# Patient Record
Sex: MALE | Race: WHITE | NOT HISPANIC OR LATINO
[De-identification: names, ages, dates, MRNs, and addresses within clinical notes are randomized per-mention and may not be internally consistent; named-entity substitution may affect disease eponyms.]

---

## 2017-05-16 ENCOUNTER — APPOINTMENT (OUTPATIENT)
Dept: CARDIOLOGY | Facility: CLINIC | Age: 72
End: 2017-05-16

## 2017-05-16 VITALS
DIASTOLIC BLOOD PRESSURE: 74 MMHG | WEIGHT: 166 LBS | BODY MASS INDEX: 26.06 KG/M2 | OXYGEN SATURATION: 94 % | HEART RATE: 62 BPM | SYSTOLIC BLOOD PRESSURE: 136 MMHG | HEIGHT: 67 IN

## 2017-05-16 RX ORDER — ERGOCALCIFEROL (VITAMIN D2) 1250 MCG
50000 CAPSULE ORAL
Refills: 0 | Status: ACTIVE | COMMUNITY

## 2017-05-16 RX ORDER — FEBUXOSTAT 40 MG/1
40 TABLET ORAL DAILY
Qty: 90 | Refills: 3 | Status: ACTIVE | COMMUNITY

## 2017-06-20 ENCOUNTER — APPOINTMENT (OUTPATIENT)
Dept: ULTRASOUND IMAGING | Facility: HOSPITAL | Age: 72
End: 2017-06-20

## 2017-06-20 ENCOUNTER — OUTPATIENT (OUTPATIENT)
Dept: OUTPATIENT SERVICES | Facility: HOSPITAL | Age: 72
LOS: 1 days | End: 2017-06-20
Payer: MEDICARE

## 2017-06-20 DIAGNOSIS — I73.9 PERIPHERAL VASCULAR DISEASE, UNSPECIFIED: ICD-10-CM

## 2017-06-20 PROCEDURE — 93923 UPR/LXTR ART STDY 3+ LVLS: CPT | Mod: 26

## 2017-06-20 PROCEDURE — 93926 LOWER EXTREMITY STUDY: CPT

## 2017-06-20 PROCEDURE — 93926 LOWER EXTREMITY STUDY: CPT | Mod: 26,RT

## 2017-06-20 PROCEDURE — 93923 UPR/LXTR ART STDY 3+ LVLS: CPT

## 2018-12-01 ENCOUNTER — OUTPATIENT (OUTPATIENT)
Dept: OUTPATIENT SERVICES | Facility: HOSPITAL | Age: 73
LOS: 1 days | End: 2018-12-01
Payer: MEDICARE

## 2018-12-01 PROCEDURE — G9001: CPT

## 2018-12-04 ENCOUNTER — APPOINTMENT (OUTPATIENT)
Dept: CARDIOLOGY | Facility: CLINIC | Age: 73
End: 2018-12-04
Payer: MEDICARE

## 2018-12-04 ENCOUNTER — NON-APPOINTMENT (OUTPATIENT)
Age: 73
End: 2018-12-04

## 2018-12-04 VITALS
HEIGHT: 67 IN | DIASTOLIC BLOOD PRESSURE: 82 MMHG | BODY MASS INDEX: 25.74 KG/M2 | WEIGHT: 164 LBS | HEART RATE: 69 BPM | OXYGEN SATURATION: 98 % | SYSTOLIC BLOOD PRESSURE: 177 MMHG

## 2018-12-04 PROCEDURE — 99215 OFFICE O/P EST HI 40 MIN: CPT

## 2018-12-04 PROCEDURE — 93000 ELECTROCARDIOGRAM COMPLETE: CPT

## 2018-12-04 RX ORDER — LOSARTAN POTASSIUM 50 MG/1
50 TABLET, FILM COATED ORAL DAILY
Qty: 90 | Refills: 3 | Status: DISCONTINUED | COMMUNITY
Start: 2018-12-04 | End: 2018-12-04

## 2018-12-04 NOTE — PHYSICAL EXAM
[General Appearance - Well Developed] : well developed [Normal Appearance] : normal appearance [Well Groomed] : well groomed [General Appearance - Well Nourished] : well nourished [No Deformities] : no deformities [General Appearance - In No Acute Distress] : no acute distress [Normal Conjunctiva] : the conjunctiva exhibited no abnormalities [Eyelids - No Xanthelasma] : the eyelids demonstrated no xanthelasmas [Normal Oral Mucosa] : normal oral mucosa [No Oral Pallor] : no oral pallor [No Oral Cyanosis] : no oral cyanosis [Normal Jugular Venous A Waves Present] : normal jugular venous A waves present [Normal Jugular Venous V Waves Present] : normal jugular venous V waves present [No Jugular Venous Arrieta A Waves] : no jugular venous arrieta A waves [Respiration, Rhythm And Depth] : normal respiratory rhythm and effort [Exaggerated Use Of Accessory Muscles For Inspiration] : no accessory muscle use [Auscultation Breath Sounds / Voice Sounds] : lungs were clear to auscultation bilaterally [Heart Rate And Rhythm] : heart rate and rhythm were normal [Heart Sounds] : normal S1 and S2 [Murmurs] : no murmurs present [Abdomen Soft] : soft [Abdomen Tenderness] : non-tender [Abdomen Mass (___ Cm)] : no abdominal mass palpated [Abnormal Walk] : normal gait [Gait - Sufficient For Exercise Testing] : the gait was sufficient for exercise testing [Nail Clubbing] : no clubbing of the fingernails [Cyanosis, Localized] : no localized cyanosis [Petechial Hemorrhages (___cm)] : no petechial hemorrhages [Skin Color & Pigmentation] : normal skin color and pigmentation [] : no rash [No Venous Stasis] : no venous stasis [Skin Lesions] : no skin lesions [No Skin Ulcers] : no skin ulcer [No Xanthoma] : no  xanthoma was observed [Oriented To Time, Place, And Person] : oriented to person, place, and time [Affect] : the affect was normal [Mood] : the mood was normal [No Anxiety] : not feeling anxious

## 2018-12-11 ENCOUNTER — EMERGENCY (EMERGENCY)
Facility: HOSPITAL | Age: 73
LOS: 1 days | Discharge: HOME | End: 2018-12-11
Attending: EMERGENCY MEDICINE
Payer: MEDICARE

## 2018-12-11 VITALS
HEIGHT: 69 IN | SYSTOLIC BLOOD PRESSURE: 183 MMHG | HEART RATE: 69 BPM | OXYGEN SATURATION: 96 % | DIASTOLIC BLOOD PRESSURE: 65 MMHG | WEIGHT: 179.9 LBS | RESPIRATION RATE: 16 BRPM

## 2018-12-11 VITALS — TEMPERATURE: 97 F

## 2018-12-11 DIAGNOSIS — Z71.89 OTHER SPECIFIED COUNSELING: ICD-10-CM

## 2018-12-11 LAB
ALBUMIN SERPL ELPH-MCNC: 3.6 G/DL — SIGNIFICANT CHANGE UP (ref 3.5–5)
ALP SERPL-CCNC: 68 U/L — SIGNIFICANT CHANGE UP (ref 40–120)
ALT FLD-CCNC: 18 U/L DA — SIGNIFICANT CHANGE UP (ref 10–60)
ANION GAP SERPL CALC-SCNC: 8 MMOL/L — SIGNIFICANT CHANGE UP (ref 5–17)
APTT BLD: 33.6 SEC — SIGNIFICANT CHANGE UP (ref 27.5–36.3)
AST SERPL-CCNC: 18 U/L — SIGNIFICANT CHANGE UP (ref 10–40)
BASOPHILS # BLD AUTO: 0.1 K/UL — SIGNIFICANT CHANGE UP (ref 0–0.2)
BASOPHILS NFR BLD AUTO: 1.5 % — SIGNIFICANT CHANGE UP (ref 0–2)
BILIRUB SERPL-MCNC: 0.3 MG/DL — SIGNIFICANT CHANGE UP (ref 0.2–1.2)
BUN SERPL-MCNC: 18 MG/DL — SIGNIFICANT CHANGE UP (ref 7–18)
CALCIUM SERPL-MCNC: 8.5 MG/DL — SIGNIFICANT CHANGE UP (ref 8.4–10.5)
CHLORIDE SERPL-SCNC: 108 MMOL/L — SIGNIFICANT CHANGE UP (ref 96–108)
CO2 SERPL-SCNC: 27 MMOL/L — SIGNIFICANT CHANGE UP (ref 22–31)
CREAT SERPL-MCNC: 1.19 MG/DL — SIGNIFICANT CHANGE UP (ref 0.5–1.3)
EOSINOPHIL # BLD AUTO: 0.4 K/UL — SIGNIFICANT CHANGE UP (ref 0–0.5)
EOSINOPHIL NFR BLD AUTO: 5.9 % — SIGNIFICANT CHANGE UP (ref 0–6)
GLUCOSE SERPL-MCNC: 91 MG/DL — SIGNIFICANT CHANGE UP (ref 70–99)
HCT VFR BLD CALC: 39.1 % — SIGNIFICANT CHANGE UP (ref 39–50)
HGB BLD-MCNC: 12.5 G/DL — LOW (ref 13–17)
INR BLD: 0.97 RATIO — SIGNIFICANT CHANGE UP (ref 0.88–1.16)
LYMPHOCYTES # BLD AUTO: 1.7 K/UL — SIGNIFICANT CHANGE UP (ref 1–3.3)
LYMPHOCYTES # BLD AUTO: 22.8 % — SIGNIFICANT CHANGE UP (ref 13–44)
MCHC RBC-ENTMCNC: 29.6 PG — SIGNIFICANT CHANGE UP (ref 27–34)
MCHC RBC-ENTMCNC: 32 GM/DL — SIGNIFICANT CHANGE UP (ref 32–36)
MCV RBC AUTO: 92.6 FL — SIGNIFICANT CHANGE UP (ref 80–100)
MONOCYTES # BLD AUTO: 0.9 K/UL — SIGNIFICANT CHANGE UP (ref 0–0.9)
MONOCYTES NFR BLD AUTO: 11.7 % — SIGNIFICANT CHANGE UP (ref 2–14)
NEUTROPHILS # BLD AUTO: 4.3 K/UL — SIGNIFICANT CHANGE UP (ref 1.8–7.4)
NEUTROPHILS NFR BLD AUTO: 58 % — SIGNIFICANT CHANGE UP (ref 43–77)
PLATELET # BLD AUTO: 200 K/UL — SIGNIFICANT CHANGE UP (ref 150–400)
POTASSIUM SERPL-MCNC: 4 MMOL/L — SIGNIFICANT CHANGE UP (ref 3.5–5.3)
POTASSIUM SERPL-SCNC: 4 MMOL/L — SIGNIFICANT CHANGE UP (ref 3.5–5.3)
PROT SERPL-MCNC: 7.4 G/DL — SIGNIFICANT CHANGE UP (ref 6–8.3)
PROTHROM AB SERPL-ACNC: 10.8 SEC — SIGNIFICANT CHANGE UP (ref 10–12.9)
RBC # BLD: 4.22 M/UL — SIGNIFICANT CHANGE UP (ref 4.2–5.8)
RBC # FLD: 12.8 % — SIGNIFICANT CHANGE UP (ref 10.3–14.5)
SODIUM SERPL-SCNC: 143 MMOL/L — SIGNIFICANT CHANGE UP (ref 135–145)
TROPONIN I SERPL-MCNC: <0.015 NG/ML — SIGNIFICANT CHANGE UP (ref 0–0.04)
WBC # BLD: 7.4 K/UL — SIGNIFICANT CHANGE UP (ref 3.8–10.5)
WBC # FLD AUTO: 7.4 K/UL — SIGNIFICANT CHANGE UP (ref 3.8–10.5)

## 2018-12-11 PROCEDURE — 86900 BLOOD TYPING SEROLOGIC ABO: CPT

## 2018-12-11 PROCEDURE — 82962 GLUCOSE BLOOD TEST: CPT

## 2018-12-11 PROCEDURE — 80053 COMPREHEN METABOLIC PANEL: CPT

## 2018-12-11 PROCEDURE — 85027 COMPLETE CBC AUTOMATED: CPT

## 2018-12-11 PROCEDURE — 84484 ASSAY OF TROPONIN QUANT: CPT

## 2018-12-11 PROCEDURE — 70450 CT HEAD/BRAIN W/O DYE: CPT | Mod: 26

## 2018-12-11 PROCEDURE — 85610 PROTHROMBIN TIME: CPT

## 2018-12-11 PROCEDURE — 86901 BLOOD TYPING SEROLOGIC RH(D): CPT

## 2018-12-11 PROCEDURE — 86850 RBC ANTIBODY SCREEN: CPT

## 2018-12-11 PROCEDURE — 70450 CT HEAD/BRAIN W/O DYE: CPT

## 2018-12-11 PROCEDURE — 85730 THROMBOPLASTIN TIME PARTIAL: CPT

## 2018-12-11 PROCEDURE — 93005 ELECTROCARDIOGRAM TRACING: CPT

## 2018-12-11 PROCEDURE — 99285 EMERGENCY DEPT VISIT HI MDM: CPT

## 2018-12-11 RX ORDER — LOSARTAN POTASSIUM 100 MG/1
50 TABLET, FILM COATED ORAL DAILY
Qty: 0 | Refills: 0 | Status: DISCONTINUED | OUTPATIENT
Start: 2018-12-11 | End: 2018-12-15

## 2018-12-11 RX ADMIN — Medication 2.5 MILLIGRAM(S): at 05:32

## 2018-12-11 RX ADMIN — LOSARTAN POTASSIUM 50 MILLIGRAM(S): 100 TABLET, FILM COATED ORAL at 05:33

## 2018-12-11 NOTE — ED PROVIDER NOTE - MEDICAL DECISION MAKING DETAILS
6:13a- Pt is well appearing, /59, HR 60. No focal deficits, no chest pain, no shortness of breath. Pt will continue with his Losartan, Atenolol, and Enalapril.  Pt will f/u with his cardiologist Dr. Paz.  Pt is well appearing walking with normal gait, stable for discharge and follow up with medical doctor. Pt educated on care and need for follow up. Discussed anticipatory guidance and return precautions. Questions answered. I had a detailed discussion with the patient and/or guardian regarding the historical points, exam findings, and any diagnostic results supporting the discharge diagnosis.

## 2018-12-11 NOTE — ED ADULT TRIAGE NOTE - CHIEF COMPLAINT QUOTE
NOTIFICATION TO R/O CVA,Patient c/o left sided weakness,patient was last seen well was 0415, on arrival assessed by Dr. Morales ,no weakness seen

## 2018-12-11 NOTE — ED PROVIDER NOTE - PROGRESS NOTE DETAILS
Pt has no complaints on arrival. No weakness. Kernig and Brudzinski signs area negative, no petechiae, no photophobia, no dysarthria, no facial asymmetry, no focal deficits.  NIH stroke scale is zero. Pt denied paresthesia or arm weakness states he felt that his left arm was swollen prompting him to take Atenolol 25mg at 4:05am. No arm swelling noted. Pt has no CNS findings to activate stroke code. I will manage BP. Pt has no chest pian or shortness of breath.

## 2018-12-11 NOTE — ED PROVIDER NOTE - NSFOLLOWUPINSTRUCTIONS_ED_ALL_ED_FT
Return if you have pain, shortness of breath any concerns. Continue with your prescribed mara cations.  See your doctor as soon as possible (within 1-2 days).   If you need further assistance for appointments you can contact the [place] [facility] Care Coordinator at [phone]. In addition our outpatient [facility] is located at [address] [place] [address], tel: [phone].

## 2018-12-11 NOTE — ED PROVIDER NOTE - OBJECTIVE STATEMENT
74 y/o M w/ PMHx of HTN, in HTN crisis for several weeks, BIB EMS to ED as notification. Pt woke up at 0400 today, feeling unwell, at which point he called his son. Family was with pt by 0415, when pt noted LUE numbness and inability to grasp w/ L hand. Family does not report any facial weakness, asymmetry, slurred speech, motor weakness.  Pt does note headache to back of head. Pt w/ no other complaints in ER. Pt was noted to have BP of 196/95 w/ HR of 96 at home, at which point he took additional dose of Atenolol, 25mg. Allergy: Sulfa drugs. 72 y/o M w/ PMHx of HTN, in hypertensive crisis for several weeks, BIB EMS to ED as notification. Pt woke up at 0400 today, feeling unwell, at which point he called his son. Family was with pt by 0415, when pt noted LUE numbness and inability to grasp w/ L hand. Family does not report any facial weakness, asymmetry, slurred speech, motor weakness.  Pt does note headache to back of head. Pt w/ no other complaints in ER. Pt was noted to have BP of 196/95 w/ HR of 96 at home, at which point he took additional dose of Atenolol, 25mg. As per son, pt has been adjusting his own HTN medication. Pt should be on Atenolol 25mg, Enalapril 2.5mg, Losartan 50mg. Pt has been cutting medications into thirds due to concern of kidney function test and bradycardia. Allergy: Sulfa drugs. 74 y/o M w/ PMHx of HTN, in hypertensive crisis for several weeks, BIB EMS to ED as notification. Pt woke up at 0400 today, feeling unwell, at which point he called his son. Family was with pt by 0415, when pt stated his left arm felt swollen. Family does not report any facial weakness, asymmetry, slurred speech, motor weakness.  Pt does note headache, slow pressure sensation to back of head. Pt w/ no other complaints in ER. Pt was noted to have BP of 196/95 w/ HR of 96 at home, at which point he took additional dose of Atenolol, 25mg. As per son, pt has been adjusting his own HTN medication. Pt should be on Atenolol 25mg, Enalapril 2.5mg, Losartan 50mg. Pt has been cutting medications into thirds due to concern of kidney function test and bradycardia. Allergy: Sulfa drugs.

## 2018-12-12 NOTE — ED ADULT NURSE NOTE - NSIMPLEMENTINTERV_GEN_ALL_ED
Implemented All Universal Safety Interventions:  [place] to call system. Call bell, personal items and telephone within reach. Instruct patient to call for assistance. Room bathroom lighting operational. Non-slip footwear when patient is off stretcher. Physically safe environment: no spills, clutter or unnecessary equipment. Stretcher in lowest position, wheels locked, appropriate side rails in place.

## 2018-12-12 NOTE — ED ADULT NURSE NOTE - OBJECTIVE STATEMENT
74 y/o M w/ PMHx of HTN, in hypertensive crisis for several weeks, BIB EMS to ED as notification. Pt woke up at 0400 today, feeling unwell, at which point he called his son. Family was with pt by 0415, when pt stated his left arm felt swollen. Family does not report any facial weakness, asymmetry, slurred speech, motor weakness.  Pt does note headache, slow pressure sensation to back of head. Pt w/ no other complaints in ER. Pt was noted to have BP of 196/95 w/ HR of 96 at home, at which point he took additional dose of Atenolol, 25mg. As per son, pt has been adjusting his own HTN medication. Pt should be on Atenolol 25mg, Enalapril 2.5mg, Losartan 50mg. Pt has been cutting medications into thirds due to concern of kidney function test and bradycardia. Allergy: Sulfa drugs.

## 2018-12-27 NOTE — REASON FOR VISIT
[Initial Evaluation] : an initial evaluation of [Hypertension] : hypertension [FreeTextEntry1] : CKD

## 2018-12-27 NOTE — HISTORY OF PRESENT ILLNESS
[FreeTextEntry1] : Patient is a 74 yo man with h/o HTN, CKD presented to follow up in setrting of worse BP control and worsened CKD.

## 2018-12-27 NOTE — DISCUSSION/SUMMARY
[FreeTextEntry1] : Patient is a 68 yo man with h/o HTN, CKD presented to establish care in setrting of worse BP control and worsened CKD.\par - will add enalapril \par - bp borderline\par - ecg with no acute changes

## 2019-06-11 ENCOUNTER — APPOINTMENT (OUTPATIENT)
Dept: CARDIOLOGY | Facility: CLINIC | Age: 74
End: 2019-06-11

## 2021-01-18 ENCOUNTER — FORM ENCOUNTER (OUTPATIENT)
Age: 76
End: 2021-01-18

## 2021-01-19 ENCOUNTER — APPOINTMENT (OUTPATIENT)
Dept: DISASTER EMERGENCY | Facility: CLINIC | Age: 76
End: 2021-01-19

## 2021-01-19 ENCOUNTER — TRANSCRIPTION ENCOUNTER (OUTPATIENT)
Age: 76
End: 2021-01-19

## 2021-01-19 ENCOUNTER — OUTPATIENT (OUTPATIENT)
Dept: INPATIENT UNIT | Facility: HOSPITAL | Age: 76
LOS: 1 days | Discharge: HOME | End: 2021-01-19

## 2021-01-19 VITALS
SYSTOLIC BLOOD PRESSURE: 128 MMHG | DIASTOLIC BLOOD PRESSURE: 70 MMHG | RESPIRATION RATE: 18 BRPM | OXYGEN SATURATION: 96 % | TEMPERATURE: 99 F | HEART RATE: 74 BPM

## 2021-01-19 VITALS
DIASTOLIC BLOOD PRESSURE: 69 MMHG | RESPIRATION RATE: 18 BRPM | HEART RATE: 89 BPM | HEIGHT: 67 IN | SYSTOLIC BLOOD PRESSURE: 142 MMHG | OXYGEN SATURATION: 95 % | WEIGHT: 154.1 LBS | TEMPERATURE: 98 F

## 2021-01-19 PROBLEM — I10 ESSENTIAL (PRIMARY) HYPERTENSION: Chronic | Status: ACTIVE | Noted: 2018-12-11

## 2021-01-19 RX ORDER — ACETAMINOPHEN 500 MG
650 TABLET ORAL ONCE
Refills: 0 | Status: DISCONTINUED | OUTPATIENT
Start: 2021-01-19 | End: 2021-01-19

## 2021-01-19 RX ORDER — BAMLANIVIMAB 35 MG/ML
700 INJECTION, SOLUTION INTRAVENOUS ONCE
Refills: 0 | Status: COMPLETED | OUTPATIENT
Start: 2021-01-19 | End: 2021-01-19

## 2021-01-19 RX ADMIN — BAMLANIVIMAB 270 MILLIGRAM(S): 35 INJECTION, SOLUTION INTRAVENOUS at 13:50

## 2021-01-19 NOTE — CHART NOTE - NSCHARTNOTEFT_GEN_A_CORE
Medicine Progress Note    Patient is a 75y old  Male who presents Covid+.  Pt offers no complaints at this time.      MEDICATIONS  (PRN):  acetaminophen   Tablet .. 650 milliGRAM(s) Oral once PRN Temp greater or equal to 38C (100.4F)    PHYSICAL EXAM:  Vital Signs Last 24 Hrs  T(C): 37.2 (19 Jan 2021 15:50), Max: 37.6 (19 Jan 2021 14:05)  T(F): 99 (19 Jan 2021 15:50), Max: 99.7 (19 Jan 2021 14:05)  HR: 74 (19 Jan 2021 15:50) (74 - 89)  BP: 128/70 (19 Jan 2021 15:50) (119/67 - 142/69)  BP(mean): --  RR: 18 (19 Jan 2021 15:50) (18 - 18)  SpO2: 96% (19 Jan 2021 15:50) (94% - 96%)    CONSTITUTIONAL: NAD, well-developed, well-groomed  ENMT: Moist oral mucosa, no pharyngeal injection or exudates; normal dentition  RESPIRATORY: Normal respiratory effort; lungs are clear to auscultation bilaterally  CARDIOVASCULAR: Regular rate and rhythm, normal S1 and S2, no murmur/rub/gallop; No lower extremity edema; Peripheral pulses are 2+ bilaterally  ABDOMEN: Nontender to palpation, normoactive bowel sounds, no rebound/guarding; No hepatosplenomegaly  PSYCH: A+O to person, place, and time; affect appropriate  NEUROLOGY: CN 2-12 are intact and symmetric; no gross sensory deficits   SKIN: No rashes; no palpable lesions    Patient COVID + with risk factors s/p Monoclonal Antibody infusion.     -Tyl prn temp > 100.4  -If worsening of condition present to ED

## 2021-01-20 ENCOUNTER — APPOINTMENT (OUTPATIENT)
Dept: DISASTER EMERGENCY | Facility: HOSPITAL | Age: 76
End: 2021-01-20

## 2021-01-20 ENCOUNTER — TRANSCRIPTION ENCOUNTER (OUTPATIENT)
Age: 76
End: 2021-01-20

## 2021-01-21 DIAGNOSIS — U07.1 COVID-19: ICD-10-CM

## 2021-01-25 ENCOUNTER — TRANSCRIPTION ENCOUNTER (OUTPATIENT)
Age: 76
End: 2021-01-25

## 2021-01-26 ENCOUNTER — TRANSCRIPTION ENCOUNTER (OUTPATIENT)
Age: 76
End: 2021-01-26

## 2021-01-27 ENCOUNTER — INPATIENT (INPATIENT)
Facility: HOSPITAL | Age: 76
LOS: 6 days | Discharge: HOME | DRG: 177 | End: 2021-02-03
Attending: HOSPITALIST | Admitting: HOSPITALIST
Payer: MEDICARE

## 2021-01-27 VITALS
OXYGEN SATURATION: 96 % | HEART RATE: 78 BPM | TEMPERATURE: 99 F | RESPIRATION RATE: 20 BRPM | WEIGHT: 179.9 LBS | DIASTOLIC BLOOD PRESSURE: 82 MMHG | HEIGHT: 67 IN | SYSTOLIC BLOOD PRESSURE: 156 MMHG

## 2021-01-27 DIAGNOSIS — Z02.9 ENCOUNTER FOR ADMINISTRATIVE EXAMINATIONS, UNSPECIFIED: ICD-10-CM

## 2021-01-27 DIAGNOSIS — Z29.9 ENCOUNTER FOR PROPHYLACTIC MEASURES, UNSPECIFIED: ICD-10-CM

## 2021-01-27 DIAGNOSIS — U07.1 COVID-19: ICD-10-CM

## 2021-01-27 DIAGNOSIS — I10 ESSENTIAL (PRIMARY) HYPERTENSION: ICD-10-CM

## 2021-01-27 DIAGNOSIS — Z87.438 PERSONAL HISTORY OF OTHER DISEASES OF MALE GENITAL ORGANS: ICD-10-CM

## 2021-01-27 DIAGNOSIS — N17.9 ACUTE KIDNEY FAILURE, UNSPECIFIED: ICD-10-CM

## 2021-01-27 LAB
ALBUMIN SERPL ELPH-MCNC: 3.4 G/DL — SIGNIFICANT CHANGE UP (ref 3.3–5)
ALP SERPL-CCNC: 48 U/L — SIGNIFICANT CHANGE UP (ref 40–120)
ALT FLD-CCNC: 19 U/L — SIGNIFICANT CHANGE UP (ref 10–45)
ANION GAP SERPL CALC-SCNC: 12 MMOL/L — SIGNIFICANT CHANGE UP (ref 5–17)
AST SERPL-CCNC: 21 U/L — SIGNIFICANT CHANGE UP (ref 10–40)
BASE EXCESS BLDV CALC-SCNC: 0.9 MMOL/L — SIGNIFICANT CHANGE UP (ref -2–2)
BASOPHILS # BLD AUTO: 0.02 K/UL — SIGNIFICANT CHANGE UP (ref 0–0.2)
BASOPHILS NFR BLD AUTO: 0.3 % — SIGNIFICANT CHANGE UP (ref 0–2)
BILIRUB SERPL-MCNC: 0.4 MG/DL — SIGNIFICANT CHANGE UP (ref 0.2–1.2)
BUN SERPL-MCNC: 24 MG/DL — HIGH (ref 7–23)
CA-I SERPL-SCNC: 1.17 MMOL/L — SIGNIFICANT CHANGE UP (ref 1.12–1.3)
CALCIUM SERPL-MCNC: 8.6 MG/DL — SIGNIFICANT CHANGE UP (ref 8.4–10.5)
CHLORIDE BLDV-SCNC: 102 MMOL/L — SIGNIFICANT CHANGE UP (ref 96–108)
CHLORIDE SERPL-SCNC: 101 MMOL/L — SIGNIFICANT CHANGE UP (ref 96–108)
CO2 BLDV-SCNC: 29 MMOL/L — SIGNIFICANT CHANGE UP (ref 22–30)
CO2 SERPL-SCNC: 26 MMOL/L — SIGNIFICANT CHANGE UP (ref 22–31)
CREAT SERPL-MCNC: 1.45 MG/DL — HIGH (ref 0.5–1.3)
CRP SERPL-MCNC: 8.66 MG/DL — HIGH (ref 0–0.4)
D DIMER BLD IA.RAPID-MCNC: 332 NG/ML DDU — HIGH
EOSINOPHIL # BLD AUTO: 0.06 K/UL — SIGNIFICANT CHANGE UP (ref 0–0.5)
EOSINOPHIL NFR BLD AUTO: 0.8 % — SIGNIFICANT CHANGE UP (ref 0–6)
FERRITIN SERPL-MCNC: 632 NG/ML — HIGH (ref 30–400)
GAS PNL BLDV: 135 MMOL/L — SIGNIFICANT CHANGE UP (ref 135–145)
GAS PNL BLDV: SIGNIFICANT CHANGE UP
GAS PNL BLDV: SIGNIFICANT CHANGE UP
GLUCOSE BLDV-MCNC: 88 MG/DL — SIGNIFICANT CHANGE UP (ref 70–99)
GLUCOSE SERPL-MCNC: 86 MG/DL — SIGNIFICANT CHANGE UP (ref 70–99)
HCO3 BLDV-SCNC: 27 MMOL/L — SIGNIFICANT CHANGE UP (ref 21–29)
HCT VFR BLD CALC: 34.4 % — LOW (ref 39–50)
HCT VFR BLDA CALC: 35 % — LOW (ref 39–50)
HGB BLD CALC-MCNC: 11.5 G/DL — LOW (ref 13–17)
HGB BLD-MCNC: 11.1 G/DL — LOW (ref 13–17)
IMM GRANULOCYTES NFR BLD AUTO: 2.1 % — HIGH (ref 0–1.5)
LACTATE BLDV-MCNC: 2.1 MMOL/L — HIGH (ref 0.7–2)
LYMPHOCYTES # BLD AUTO: 0.7 K/UL — LOW (ref 1–3.3)
LYMPHOCYTES # BLD AUTO: 9.7 % — LOW (ref 13–44)
MCHC RBC-ENTMCNC: 29.5 PG — SIGNIFICANT CHANGE UP (ref 27–34)
MCHC RBC-ENTMCNC: 32.3 GM/DL — SIGNIFICANT CHANGE UP (ref 32–36)
MCV RBC AUTO: 91.5 FL — SIGNIFICANT CHANGE UP (ref 80–100)
MONOCYTES # BLD AUTO: 0.78 K/UL — SIGNIFICANT CHANGE UP (ref 0–0.9)
MONOCYTES NFR BLD AUTO: 10.9 % — SIGNIFICANT CHANGE UP (ref 2–14)
NEUTROPHILS # BLD AUTO: 5.47 K/UL — SIGNIFICANT CHANGE UP (ref 1.8–7.4)
NEUTROPHILS NFR BLD AUTO: 76.2 % — SIGNIFICANT CHANGE UP (ref 43–77)
NRBC # BLD: 0 /100 WBCS — SIGNIFICANT CHANGE UP (ref 0–0)
PCO2 BLDV: 56 MMHG — HIGH (ref 35–50)
PH BLDV: 7.31 — LOW (ref 7.35–7.45)
PLATELET # BLD AUTO: 239 K/UL — SIGNIFICANT CHANGE UP (ref 150–400)
PO2 BLDV: 26 MMHG — SIGNIFICANT CHANGE UP (ref 25–45)
POTASSIUM BLDV-SCNC: 3.8 MMOL/L — SIGNIFICANT CHANGE UP (ref 3.5–5.3)
POTASSIUM SERPL-MCNC: 4.1 MMOL/L — SIGNIFICANT CHANGE UP (ref 3.5–5.3)
POTASSIUM SERPL-SCNC: 4.1 MMOL/L — SIGNIFICANT CHANGE UP (ref 3.5–5.3)
PROCALCITONIN SERPL-MCNC: 0.1 NG/ML — SIGNIFICANT CHANGE UP (ref 0.02–0.1)
PROT SERPL-MCNC: 6.4 G/DL — SIGNIFICANT CHANGE UP (ref 6–8.3)
RBC # BLD: 3.76 M/UL — LOW (ref 4.2–5.8)
RBC # FLD: 13.7 % — SIGNIFICANT CHANGE UP (ref 10.3–14.5)
SAO2 % BLDV: 37 % — LOW (ref 67–88)
SARS-COV-2 RNA SPEC QL NAA+PROBE: DETECTED
SODIUM SERPL-SCNC: 139 MMOL/L — SIGNIFICANT CHANGE UP (ref 135–145)
TROPONIN T, HIGH SENSITIVITY RESULT: 13 NG/L — SIGNIFICANT CHANGE UP (ref 0–51)
WBC # BLD: 7.18 K/UL — SIGNIFICANT CHANGE UP (ref 3.8–10.5)
WBC # FLD AUTO: 7.18 K/UL — SIGNIFICANT CHANGE UP (ref 3.8–10.5)

## 2021-01-27 PROCEDURE — G1004: CPT

## 2021-01-27 PROCEDURE — 99223 1ST HOSP IP/OBS HIGH 75: CPT | Mod: CS

## 2021-01-27 PROCEDURE — 71275 CT ANGIOGRAPHY CHEST: CPT | Mod: 26,MG

## 2021-01-27 PROCEDURE — 71045 X-RAY EXAM CHEST 1 VIEW: CPT | Mod: 26

## 2021-01-27 PROCEDURE — 93010 ELECTROCARDIOGRAM REPORT: CPT

## 2021-01-27 PROCEDURE — 99285 EMERGENCY DEPT VISIT HI MDM: CPT | Mod: CS

## 2021-01-27 RX ORDER — PIPERACILLIN AND TAZOBACTAM 4; .5 G/20ML; G/20ML
3.38 INJECTION, POWDER, LYOPHILIZED, FOR SOLUTION INTRAVENOUS EVERY 8 HOURS
Refills: 0 | Status: DISCONTINUED | OUTPATIENT
Start: 2021-01-28 | End: 2021-02-03

## 2021-01-27 RX ORDER — PIPERACILLIN AND TAZOBACTAM 4; .5 G/20ML; G/20ML
3.38 INJECTION, POWDER, LYOPHILIZED, FOR SOLUTION INTRAVENOUS ONCE
Refills: 0 | Status: COMPLETED | OUTPATIENT
Start: 2021-01-27 | End: 2021-01-27

## 2021-01-27 RX ORDER — ACETAMINOPHEN 500 MG
650 TABLET ORAL EVERY 4 HOURS
Refills: 0 | Status: DISCONTINUED | OUTPATIENT
Start: 2021-01-27 | End: 2021-02-03

## 2021-01-27 RX ORDER — DOXAZOSIN MESYLATE 4 MG
4 TABLET ORAL AT BEDTIME
Refills: 0 | Status: DISCONTINUED | OUTPATIENT
Start: 2021-01-27 | End: 2021-02-03

## 2021-01-27 RX ORDER — AMLODIPINE BESYLATE 2.5 MG/1
2.5 TABLET ORAL DAILY
Refills: 0 | Status: DISCONTINUED | OUTPATIENT
Start: 2021-01-27 | End: 2021-02-03

## 2021-01-27 RX ORDER — ENOXAPARIN SODIUM 100 MG/ML
40 INJECTION SUBCUTANEOUS DAILY
Refills: 0 | Status: DISCONTINUED | OUTPATIENT
Start: 2021-01-27 | End: 2021-02-03

## 2021-01-27 RX ORDER — DEXAMETHASONE 0.5 MG/5ML
6 ELIXIR ORAL ONCE
Refills: 0 | Status: COMPLETED | OUTPATIENT
Start: 2021-01-27 | End: 2021-01-27

## 2021-01-27 RX ORDER — SODIUM CHLORIDE 9 MG/ML
1000 INJECTION, SOLUTION INTRAVENOUS ONCE
Refills: 0 | Status: COMPLETED | OUTPATIENT
Start: 2021-01-27 | End: 2021-01-27

## 2021-01-27 RX ORDER — ASPIRIN/CALCIUM CARB/MAGNESIUM 324 MG
81 TABLET ORAL DAILY
Refills: 0 | Status: DISCONTINUED | OUTPATIENT
Start: 2021-01-27 | End: 2021-02-03

## 2021-01-27 RX ADMIN — Medication 4 MILLIGRAM(S): at 23:10

## 2021-01-27 RX ADMIN — PIPERACILLIN AND TAZOBACTAM 200 GRAM(S): 4; .5 INJECTION, POWDER, LYOPHILIZED, FOR SOLUTION INTRAVENOUS at 22:38

## 2021-01-27 RX ADMIN — AMLODIPINE BESYLATE 2.5 MILLIGRAM(S): 2.5 TABLET ORAL at 22:38

## 2021-01-27 RX ADMIN — SODIUM CHLORIDE 1000 MILLILITER(S): 9 INJECTION, SOLUTION INTRAVENOUS at 16:19

## 2021-01-27 RX ADMIN — Medication 6 MILLIGRAM(S): at 22:38

## 2021-01-27 NOTE — ED ADULT NURSE NOTE - OBJECTIVE STATEMENT
[id number] 75 yr old WM brought to ER via ambulance on stretcher for further eval and tx of decreased pulse ox of 92% at home and c/o SOB today. Diagnosed with COVID19 9 days ago. Received monoclonal infusion last week Finished Decadron 2 days ago. Was started on Levaquin yesterday. Diagnosed with COVID19 pneumonia. given 1 liter IV NS at home last night. Fever 2 days ago, none now. Was told that his creatinine is elevated. Pt speaks Italian. Pt's son interpreting on phone. Color pink. Skin W&D. Dry cough audible. Breath sounds decreased at right base. c/o mild right lower back pain at present. Airborne and contact isolation maintained 1305 75 yr old WM brought to ER via ambulance on stretcher for further eval and tx of decreased pulse ox of 92% at home and c/o SOB today. Diagnosed with COVID19 9 days ago. Received monoclonal infusion last week. Was feeling much better for a few days. "Felt so good that he was exercising." Finished Decadron 2 days ago. Was started on Levaquin yesterday. Diagnosed with COVID19 pneumonia. given 1 liter IV NS at home last night. Fever 2 days ago, none now. Was told that his creatinine is elevated. Pt speaks Gabonese. Pt's son interpreting on phone. Color pink. Skin W&D. Dry cough audible. Breath sounds decreased at right base. c/o mild right lower back pain at present. Airborne and contact isolation maintained Awaiting eval by ER

## 2021-01-27 NOTE — H&P ADULT - PROBLEM SELECTOR PLAN 1
See above.   S/P monoclonal antibody and 7 day course of Decadron but still with oxygen requirements setting of SARS-CoV2.   Empiric IV Zosyn to cover for bacterial superinfection.   Decadron 6 mg IV x 1 (see above) for Emergency Use Authorization for mortality benefit but would consider formal ID evaluation in the AM.

## 2021-01-27 NOTE — H&P ADULT - NSHPREVIEWOFSYSTEMS_GEN_ALL_CORE
No chest pain/pressure.  NO palpitations.  NO HA, no focal weakness.  No abdominal pain, no red blood per rectum or melena.  No back pain, no tearing back pain.  NO rash.    NO joint pain.  Anorexia but denies weight loss.  NO SI/HI.  NO dysuria, no hematuria.  No thyroid symptoms.

## 2021-01-27 NOTE — H&P ADULT - NSHPSOCIALHISTORY_GEN_ALL_CORE
No tobacco or ethanol reported.  Lives with spouse (spouse tested positive) and with adult son above (reported negative COVID-19 test)

## 2021-01-27 NOTE — H&P ADULT - ASSESSMENT
NIGHT HOSPITALIST:   Referral of patient to the ER following recurrence of dyspnoea, cough following initial treatment for COVID-19 pneumonia with monoclonal antibody on 1/19/21 with Decadron course from 1/18/21 to 1/25/21 (7 days) according to the son in the setting of patient with initial improvement past the monoclonal antibody treatment, but concern for bacterial superinfection but still O2 dependent>>risks/benefits reviewed with patient/son who agree to empiric IV antibiotics with IV Zosyn for now.  Son is asking about IV remdesivir but patient with resolving mild acute kidney injury (presumably after discontinuation of patient's ACE).  CTT angiogram does not demonstrate a central PE.    Risks/benefits reviewed with patient and son who agree--given patient with SARS-CoV2 pneumonia and still is oxygen dependent--will give one dose of IV Decadron as an Emergency Use Authorization by the FDA (with goal for mortality benefit) and with patient receiving less than 10 days previously>>will attempt to provide the shortest course of steroids and would consider formal ID opinion in the AM.    Will repeat the inflammatory indices in the AM.    Patient/ son agree to pharmacologic DVT prophylaxis. NIGHT HOSPITALIST:   Referral of patient to the ER following recurrence of dyspnoea, cough following initial treatment for COVID-19 pneumonia with monoclonal antibody on 1/19/21 with Decadron course from 1/18/21 to 1/25/21 (7 days) according to the son in the setting of patient with initial improvement past the monoclonal antibody treatment, but concern for bacterial superinfection but still O2 dependent>>risks/benefits reviewed with patient/son who agree to empiric IV antibiotics with IV Zosyn for now.  Son is asking about IV remdesivir but patient with resolving mild acute kidney injury (presumably after discontinuation of patient's ACE).  CTT angiogram does not demonstrate a central PE.    Risks/benefits reviewed with patient and son who agree--given patient with SARS-CoV2 pneumonia and still is oxygen dependent--will give one dose of IV Decadron tonight as an Emergency Use Authorization by the FDA (with goal for mortality benefit) and with patient receiving less than 10 days previously>>will attempt to provide the shortest course of steroids and would consider formal ID opinion in the AM.    Will repeat the inflammatory indices in the AM.    Patient/ son agree to pharmacologic DVT prophylaxis.

## 2021-01-27 NOTE — ED ADULT NURSE NOTE - BREATHING, MLM
c/o felling SOB. Sats were 92% at home on room air per EMS c/o feeling SOB. Sats were 92% at home on room air per EMS

## 2021-01-27 NOTE — ED ADULT NURSE NOTE - DRUG PRE-SCREENING (DAST -1)
----- Message from Jackeline Tang sent at 1/14/2017 11:38 AM CST -----  Regarding: Patrick Wasserman  Contact: [phone]  Dr. Knight,  The last few days have been absolutely unbearable with Patrick. She is so uncomfortable and crying out in pain constantly. We can clearly see her choking down what's coming up her throat. It's to the point where [facility] She won't sleep more than a few minutes at a time. I really would like to try the medicine for reflux. She needs some relief. We will watch her as she sleeps and see her wake up from it coming up her throat, at which point she starts crying out in pain. We did switch to the Nutramigen formula and that is not helping. Please let us know if we can get the medicine for her, she's miserable and cries to the point of losing her voice.  Thank you,  Jackeline Tang   Statement Selected

## 2021-01-27 NOTE — H&P ADULT - HISTORY OF PRESENT ILLNESS
NIGHT HOSPITALIST:    Patient UNKNOWN to me previously, assigned to me at this point via the ER to admit this 76 y/o Bahamian M--patient refused ED video  device and history and ROS obtained from patient's bilingual son, Jamey, [phone]--patient followed by Dr. Agarwal as above--patient with a history of essential HTN, presumed BPH, hyperlipidemia not currently on Rx, reported sleep apnoea on a home O2 concentrator (?), with the patient apparently developed cough, chills, myalgias 9 days ago with patient testing COVID-19+ and apparently was arranged by Dr. Agarwal and S/P monoclonal AB treatment on 1/19/21 and apparently completed a 7 day course of Decadron from 1/18/21 to 1/25/21, with patient initially feeling better with the monoclonal antibody treatment following initial fever the first night, but has had progressive dyspnoea for the past few days, prompting patient to be referred to the ER by her PCP.   Patient apparently also had his enalapril discontinued by his physician due to azotemia with Norvasc started, and apparently had IVF provided at home and one dose of oral Levaquin.  On PRN atenolol for palpitations (?).    Son reports mucous cough by patient.  No wheezing.

## 2021-01-27 NOTE — ED PROVIDER NOTE - OBJECTIVE STATEMENT
[id number] (iLly)    76 yo male with PMHx of HTN p/w cough.  Patient reports that he developed fevers/chills, body aches, cough 9 days ago.  Patient tested positive for covid at that time.  Had monoclonal antibody infusion one week ago. Initially felt better, but has been feeling more SOB over the past few days. Patient has also been having blood work showing worsening creatinine over the past week.  Patient had IVF infusion at home yesterday.  Denies CP, abd pain, NVD, dysuria.

## 2021-01-27 NOTE — ED ADULT TRIAGE NOTE - HEART RATE (BEATS/MIN)
1. Have you been to the ER, urgent care clinic since your last visit? Hospitalized since your last visit? No 
 
2. Have you seen or consulted any other health care providers outside of the [address] [place] [address] since your last visit? Include any pap smears or colon screening.  No 
 
 77

## 2021-01-27 NOTE — H&P ADULT - ATTENDING COMMENTS
NIGHT HOSPITALIST:   Patient/ son aware of course and agree with plan/care as above.   Given patient's comorbidities, patient's long term prognosis is guarded.   Emotional support provided to patient/son.    Patient/ family are not yet ready to discuss advance directives.   Care reviewed with covering NP/PA for endorsement to my physician colleagues.    Mike Oscar MD  [phone] NIGHT HOSPITALIST:   Patient/ son aware of course and agree with plan/care as above.   Given patient's comorbidities, patient's long term prognosis is guarded.   Emotional support provided to patient/son.    Patient/ family are not yet ready to discuss advance directives.   Care reviewed with covering NP/PA, Consuelo, for endorsement to my physician colleagues.    Mike Oscar MD  [phone]

## 2021-01-27 NOTE — H&P ADULT - PROBLEM SELECTOR PLAN 6
Transitions of Care Status:  1.  Name of PCP:   Yanely Agarwal  [phone]  2.  PCP Contacted on Admission: [ ] Y    [ x] N    3.  PCP contacted at Discharge: [ ] Y    [ ] N    [ ] N/A  4.  Post-Discharge Appointment Date and Location:  5.  Summary of Handoff given to PCP:

## 2021-01-27 NOTE — H&P ADULT - NSHPPHYSICALEXAM_GEN_ALL_CORE
Physical exam with an elderly, nontoxic, but ill appearing M, in no acute distress. Physical exam with an elderly, nontoxic, but ill appearing M, in no acute distress.    Afebrile.   HR  78   RR 14   BP  150/79   98% on 2 L/min NC    HEENT< PERRL<EOMI  Neck supple  No thyromegaly  Chest decreased breath sounds RIGHT base  Cor s1 s2  Abdomen soft nontender, normal bowel sounds, no rebound.  Skin dry  Ext poor nail hygiene, no ulcers.  Neurologic AXOx3.  Speech fluent in native language.  Cognition intact.  UE/LE 5/5.  NO SI/HI.

## 2021-01-27 NOTE — H&P ADULT - NSHPLABSRESULTS_GEN_ALL_CORE
WBC 7.1    hgb 11.1    Platelets of 239K    D dimer 332.    K+ 4.1  Random glucose of 86    Cr 1.1>>1.45    Alb 3.4    Lactate 2.1    Chest radiograph reviewed with increased RIGHT hemidiaphragm with increased basilar markings.    COVID-19 PCR>>POSITIVE.    CTT angiogram with limited exam with motion and streak artifact by patient's arm position, minimal peripheral opacities with RIGHT ML COVID-19 infection and heterogeneous consolidation could represent superimposed bacterial pneumonia but no PE within the main pulmonary arteries (this was reviewed with the son)

## 2021-01-27 NOTE — ED PROVIDER NOTE - PROGRESS NOTE DETAILS
O2 sat 93% on RA with exertion.  -Jayy Bruner PA-C Discussed case with dr fair who reports O2 sat at home was 90%.  Requesting admission for further management.  -Jayy Bruner PA-C

## 2021-01-27 NOTE — ED PROVIDER NOTE - ATTENDING CONTRIBUTION TO CARE
Attending MD Song:   I personally have seen and examined this patient.  Physician assistant note reviewed and agree on plan of care and except where noted.  See below for details.     Seen in [place], [place] , [place] ([id number])  Cards Dr. Agarwal    75M with PMH/PSH including HTN presents to the ED with cough.  Reports developed symptoms about 9 days ago and tested positive for COVID.  Reports was set up for monoclonal antibody treatment.  Reports initially felt improved but has had worsening shortness of breath over the last week.  Reports presents today for worsening shortness of breath, fevers and reports was instructed to come in by Dr. Agarwal's team.  Denies chest pain, reports occasional palpitations preceding current illness. Denies abdominal pain, nausea, vomiting, diarrhea, blood in stools. Denies dysuria, hematuria, change in urinary habits including frequency, urgency. Denies numbness, weakness or tingling in extremities. Denies change in vision, double vision, sudden loss of vision. A ten (10) point review of systems was negative other than as stated in the HPI or elsewhere in the chart.     Exam:   General: NAD  HENT: head NCAT, airway patent with moist mucous membranes  Eyes: PERRL  Lungs: lungs scattered crackles in R base, occasional cough, +increase in work of breathing on RA, no wheezing, no rhonchi  Cardiac: +S1S2, no m/r/g  GI: abdomen soft with +BS, NT, ND  : no CVAT  MSK: FROM at neck, no calf tenderness, swelling, erythema or warmth  Neuro: moving all extremities spontaneously, sensory grossly intact, no gross neuro deficits  Psych: normal mood and affect     A/P: 75M with COVID s/p monoclonal ab with worsening dyspnea, DDx includes progression of COVID, PE, bacterial superinfection, will obtain COVID labs, EKG, CXR, CTA chest, will obtain ambulatory sat, reassess

## 2021-01-27 NOTE — ED ADULT NURSE NOTE - NSIMPLEMENTINTERV_GEN_ALL_ED
Implemented All Universal Safety Interventions:  [place] to call system. Call bell, personal items and telephone within reach. Instruct patient to call for assistance. Room bathroom lighting operational. Non-slip footwear when patient is off stretcher. Physically safe environment: no spills, clutter or unnecessary equipment. Stretcher in lowest position, wheels locked, appropriate side rails in place.

## 2021-01-27 NOTE — H&P ADULT - NSHPSOURCEINFOTX_GEN_ALL_CORE
Patient refused ED video  service. Patient refused ED video  service.  Surjit Concepcion  Patient refused ED video  service.  Surjit Concepcion  updated and reviewed [employer] Patient refused ED video  service.  Surjit Concepcion  updated and reviewed history and [employer]

## 2021-01-28 DIAGNOSIS — J15.9 UNSPECIFIED BACTERIAL PNEUMONIA: ICD-10-CM

## 2021-01-28 LAB
ALBUMIN SERPL ELPH-MCNC: 3.3 G/DL — SIGNIFICANT CHANGE UP (ref 3.3–5)
ALP SERPL-CCNC: 46 U/L — SIGNIFICANT CHANGE UP (ref 40–120)
ALT FLD-CCNC: 20 U/L — SIGNIFICANT CHANGE UP (ref 10–45)
ANION GAP SERPL CALC-SCNC: 14 MMOL/L — SIGNIFICANT CHANGE UP (ref 5–17)
APTT BLD: 30.7 SEC — SIGNIFICANT CHANGE UP (ref 27.5–35.5)
AST SERPL-CCNC: 20 U/L — SIGNIFICANT CHANGE UP (ref 10–40)
BASOPHILS # BLD AUTO: 0.01 K/UL — SIGNIFICANT CHANGE UP (ref 0–0.2)
BASOPHILS NFR BLD AUTO: 0.1 % — SIGNIFICANT CHANGE UP (ref 0–2)
BILIRUB SERPL-MCNC: 0.4 MG/DL — SIGNIFICANT CHANGE UP (ref 0.2–1.2)
BUN SERPL-MCNC: 26 MG/DL — HIGH (ref 7–23)
CALCIUM SERPL-MCNC: 8.6 MG/DL — SIGNIFICANT CHANGE UP (ref 8.4–10.5)
CHLORIDE SERPL-SCNC: 100 MMOL/L — SIGNIFICANT CHANGE UP (ref 96–108)
CK SERPL-CCNC: 73 U/L — SIGNIFICANT CHANGE UP (ref 30–200)
CO2 SERPL-SCNC: 22 MMOL/L — SIGNIFICANT CHANGE UP (ref 22–31)
CREAT SERPL-MCNC: 1.45 MG/DL — HIGH (ref 0.5–1.3)
CRP SERPL-MCNC: 10.26 MG/DL — HIGH (ref 0–0.4)
D DIMER BLD IA.RAPID-MCNC: 329 NG/ML DDU — HIGH
EOSINOPHIL # BLD AUTO: 0 K/UL — SIGNIFICANT CHANGE UP (ref 0–0.5)
EOSINOPHIL NFR BLD AUTO: 0 % — SIGNIFICANT CHANGE UP (ref 0–6)
FERRITIN SERPL-MCNC: 779 NG/ML — HIGH (ref 30–400)
GLUCOSE SERPL-MCNC: 154 MG/DL — HIGH (ref 70–99)
HCT VFR BLD CALC: 34.1 % — LOW (ref 39–50)
HGB BLD-MCNC: 11 G/DL — LOW (ref 13–17)
IMM GRANULOCYTES NFR BLD AUTO: 1.5 % — SIGNIFICANT CHANGE UP (ref 0–1.5)
INR BLD: 1.18 RATIO — HIGH (ref 0.88–1.16)
LDH SERPL L TO P-CCNC: 170 U/L — SIGNIFICANT CHANGE UP (ref 50–242)
LYMPHOCYTES # BLD AUTO: 0.54 K/UL — LOW (ref 1–3.3)
LYMPHOCYTES # BLD AUTO: 7.1 % — LOW (ref 13–44)
MCHC RBC-ENTMCNC: 29.3 PG — SIGNIFICANT CHANGE UP (ref 27–34)
MCHC RBC-ENTMCNC: 32.3 GM/DL — SIGNIFICANT CHANGE UP (ref 32–36)
MCV RBC AUTO: 90.9 FL — SIGNIFICANT CHANGE UP (ref 80–100)
MONOCYTES # BLD AUTO: 0.32 K/UL — SIGNIFICANT CHANGE UP (ref 0–0.9)
MONOCYTES NFR BLD AUTO: 4.2 % — SIGNIFICANT CHANGE UP (ref 2–14)
NEUTROPHILS # BLD AUTO: 6.59 K/UL — SIGNIFICANT CHANGE UP (ref 1.8–7.4)
NEUTROPHILS NFR BLD AUTO: 87.1 % — HIGH (ref 43–77)
NRBC # BLD: 0 /100 WBCS — SIGNIFICANT CHANGE UP (ref 0–0)
PLATELET # BLD AUTO: 240 K/UL — SIGNIFICANT CHANGE UP (ref 150–400)
POTASSIUM SERPL-MCNC: 4.4 MMOL/L — SIGNIFICANT CHANGE UP (ref 3.5–5.3)
POTASSIUM SERPL-SCNC: 4.4 MMOL/L — SIGNIFICANT CHANGE UP (ref 3.5–5.3)
PROT SERPL-MCNC: 6.2 G/DL — SIGNIFICANT CHANGE UP (ref 6–8.3)
PROTHROM AB SERPL-ACNC: 14.1 SEC — HIGH (ref 10.6–13.6)
RBC # BLD: 3.75 M/UL — LOW (ref 4.2–5.8)
RBC # FLD: 13.8 % — SIGNIFICANT CHANGE UP (ref 10.3–14.5)
SODIUM SERPL-SCNC: 136 MMOL/L — SIGNIFICANT CHANGE UP (ref 135–145)
TROPONIN T, HIGH SENSITIVITY RESULT: 9 NG/L — SIGNIFICANT CHANGE UP (ref 0–51)
WBC # BLD: 7.57 K/UL — SIGNIFICANT CHANGE UP (ref 3.8–10.5)
WBC # FLD AUTO: 7.57 K/UL — SIGNIFICANT CHANGE UP (ref 3.8–10.5)

## 2021-01-28 PROCEDURE — 99233 SBSQ HOSP IP/OBS HIGH 50: CPT | Mod: CS

## 2021-01-28 RX ORDER — POLYETHYLENE GLYCOL 3350 17 G/17G
17 POWDER, FOR SOLUTION ORAL DAILY
Refills: 0 | Status: DISCONTINUED | OUTPATIENT
Start: 2021-01-28 | End: 2021-02-03

## 2021-01-28 RX ADMIN — Medication 81 MILLIGRAM(S): at 12:35

## 2021-01-28 RX ADMIN — PIPERACILLIN AND TAZOBACTAM 25 GRAM(S): 4; .5 INJECTION, POWDER, LYOPHILIZED, FOR SOLUTION INTRAVENOUS at 09:54

## 2021-01-28 RX ADMIN — PIPERACILLIN AND TAZOBACTAM 25 GRAM(S): 4; .5 INJECTION, POWDER, LYOPHILIZED, FOR SOLUTION INTRAVENOUS at 02:03

## 2021-01-28 RX ADMIN — Medication 4 MILLIGRAM(S): at 22:25

## 2021-01-28 RX ADMIN — AMLODIPINE BESYLATE 2.5 MILLIGRAM(S): 2.5 TABLET ORAL at 04:56

## 2021-01-28 RX ADMIN — POLYETHYLENE GLYCOL 3350 17 GRAM(S): 17 POWDER, FOR SOLUTION ORAL at 18:47

## 2021-01-28 RX ADMIN — PIPERACILLIN AND TAZOBACTAM 25 GRAM(S): 4; .5 INJECTION, POWDER, LYOPHILIZED, FOR SOLUTION INTRAVENOUS at 17:06

## 2021-01-28 RX ADMIN — ENOXAPARIN SODIUM 40 MILLIGRAM(S): 100 INJECTION SUBCUTANEOUS at 12:35

## 2021-01-28 NOTE — PROGRESS NOTE ADULT - ASSESSMENT
This is a 74 yo male with PMHx of HTN recent dx of COVID s/p monoclonal ab infusion who presents with worsening dyspnea, found to have possible superimposed bacterial PNA

## 2021-01-29 LAB
ALBUMIN SERPL ELPH-MCNC: 3.2 G/DL — LOW (ref 3.3–5)
ALP SERPL-CCNC: 47 U/L — SIGNIFICANT CHANGE UP (ref 40–120)
ALT FLD-CCNC: 20 U/L — SIGNIFICANT CHANGE UP (ref 10–45)
ANION GAP SERPL CALC-SCNC: 11 MMOL/L — SIGNIFICANT CHANGE UP (ref 5–17)
AST SERPL-CCNC: 23 U/L — SIGNIFICANT CHANGE UP (ref 10–40)
BILIRUB SERPL-MCNC: 0.3 MG/DL — SIGNIFICANT CHANGE UP (ref 0.2–1.2)
BUN SERPL-MCNC: 26 MG/DL — HIGH (ref 7–23)
CALCIUM SERPL-MCNC: 8.5 MG/DL — SIGNIFICANT CHANGE UP (ref 8.4–10.5)
CHLORIDE SERPL-SCNC: 102 MMOL/L — SIGNIFICANT CHANGE UP (ref 96–108)
CO2 SERPL-SCNC: 23 MMOL/L — SIGNIFICANT CHANGE UP (ref 22–31)
CREAT SERPL-MCNC: 1.43 MG/DL — HIGH (ref 0.5–1.3)
GLUCOSE SERPL-MCNC: 88 MG/DL — SIGNIFICANT CHANGE UP (ref 70–99)
HCT VFR BLD CALC: 34.2 % — LOW (ref 39–50)
HGB BLD-MCNC: 11.1 G/DL — LOW (ref 13–17)
MCHC RBC-ENTMCNC: 29.1 PG — SIGNIFICANT CHANGE UP (ref 27–34)
MCHC RBC-ENTMCNC: 32.5 GM/DL — SIGNIFICANT CHANGE UP (ref 32–36)
MCV RBC AUTO: 89.8 FL — SIGNIFICANT CHANGE UP (ref 80–100)
NRBC # BLD: 0 /100 WBCS — SIGNIFICANT CHANGE UP (ref 0–0)
PLATELET # BLD AUTO: 265 K/UL — SIGNIFICANT CHANGE UP (ref 150–400)
POTASSIUM SERPL-MCNC: 4.3 MMOL/L — SIGNIFICANT CHANGE UP (ref 3.5–5.3)
POTASSIUM SERPL-SCNC: 4.3 MMOL/L — SIGNIFICANT CHANGE UP (ref 3.5–5.3)
PROT SERPL-MCNC: 6.3 G/DL — SIGNIFICANT CHANGE UP (ref 6–8.3)
RBC # BLD: 3.81 M/UL — LOW (ref 4.2–5.8)
RBC # FLD: 13.6 % — SIGNIFICANT CHANGE UP (ref 10.3–14.5)
SODIUM SERPL-SCNC: 136 MMOL/L — SIGNIFICANT CHANGE UP (ref 135–145)
WBC # BLD: 9.55 K/UL — SIGNIFICANT CHANGE UP (ref 3.8–10.5)
WBC # FLD AUTO: 9.55 K/UL — SIGNIFICANT CHANGE UP (ref 3.8–10.5)

## 2021-01-29 PROCEDURE — 99232 SBSQ HOSP IP/OBS MODERATE 35: CPT | Mod: CS

## 2021-01-29 RX ORDER — ACETAMINOPHEN 500 MG
1000 TABLET ORAL ONCE
Refills: 0 | Status: COMPLETED | OUTPATIENT
Start: 2021-01-29 | End: 2021-01-29

## 2021-01-29 RX ADMIN — AMLODIPINE BESYLATE 2.5 MILLIGRAM(S): 2.5 TABLET ORAL at 05:17

## 2021-01-29 RX ADMIN — PIPERACILLIN AND TAZOBACTAM 25 GRAM(S): 4; .5 INJECTION, POWDER, LYOPHILIZED, FOR SOLUTION INTRAVENOUS at 10:33

## 2021-01-29 RX ADMIN — Medication 100 MILLIGRAM(S): at 22:10

## 2021-01-29 RX ADMIN — Medication 4 MILLIGRAM(S): at 22:10

## 2021-01-29 RX ADMIN — Medication 81 MILLIGRAM(S): at 10:33

## 2021-01-29 RX ADMIN — PIPERACILLIN AND TAZOBACTAM 25 GRAM(S): 4; .5 INJECTION, POWDER, LYOPHILIZED, FOR SOLUTION INTRAVENOUS at 03:10

## 2021-01-29 RX ADMIN — Medication 400 MILLIGRAM(S): at 21:17

## 2021-01-29 RX ADMIN — ENOXAPARIN SODIUM 40 MILLIGRAM(S): 100 INJECTION SUBCUTANEOUS at 10:33

## 2021-01-29 RX ADMIN — Medication 1200 MILLIGRAM(S): at 17:07

## 2021-01-29 RX ADMIN — PIPERACILLIN AND TAZOBACTAM 25 GRAM(S): 4; .5 INJECTION, POWDER, LYOPHILIZED, FOR SOLUTION INTRAVENOUS at 17:07

## 2021-01-30 LAB
ANION GAP SERPL CALC-SCNC: 13 MMOL/L — SIGNIFICANT CHANGE UP (ref 5–17)
BUN SERPL-MCNC: 25 MG/DL — HIGH (ref 7–23)
CALCIUM SERPL-MCNC: 8.3 MG/DL — LOW (ref 8.4–10.5)
CHLORIDE SERPL-SCNC: 95 MMOL/L — LOW (ref 96–108)
CO2 SERPL-SCNC: 24 MMOL/L — SIGNIFICANT CHANGE UP (ref 22–31)
CREAT SERPL-MCNC: 1.58 MG/DL — HIGH (ref 0.5–1.3)
GLUCOSE SERPL-MCNC: 205 MG/DL — HIGH (ref 70–99)
POTASSIUM SERPL-MCNC: 3.9 MMOL/L — SIGNIFICANT CHANGE UP (ref 3.5–5.3)
POTASSIUM SERPL-SCNC: 3.9 MMOL/L — SIGNIFICANT CHANGE UP (ref 3.5–5.3)
SODIUM SERPL-SCNC: 132 MMOL/L — LOW (ref 135–145)

## 2021-01-30 PROCEDURE — 99233 SBSQ HOSP IP/OBS HIGH 50: CPT | Mod: CS

## 2021-01-30 RX ORDER — VANCOMYCIN HCL 1 G
1250 VIAL (EA) INTRAVENOUS ONCE
Refills: 0 | Status: COMPLETED | OUTPATIENT
Start: 2021-01-30 | End: 2021-01-30

## 2021-01-30 RX ORDER — VANCOMYCIN HCL 1 G
1250 VIAL (EA) INTRAVENOUS EVERY 12 HOURS
Refills: 0 | Status: DISCONTINUED | OUTPATIENT
Start: 2021-01-31 | End: 2021-02-01

## 2021-01-30 RX ORDER — VANCOMYCIN HCL 1 G
VIAL (EA) INTRAVENOUS
Refills: 0 | Status: DISCONTINUED | OUTPATIENT
Start: 2021-01-30 | End: 2021-02-01

## 2021-01-30 RX ADMIN — PIPERACILLIN AND TAZOBACTAM 25 GRAM(S): 4; .5 INJECTION, POWDER, LYOPHILIZED, FOR SOLUTION INTRAVENOUS at 02:39

## 2021-01-30 RX ADMIN — Medication 650 MILLIGRAM(S): at 16:30

## 2021-01-30 RX ADMIN — Medication 100 MILLIGRAM(S): at 21:18

## 2021-01-30 RX ADMIN — Medication 100 MILLIGRAM(S): at 06:24

## 2021-01-30 RX ADMIN — Medication 1200 MILLIGRAM(S): at 18:09

## 2021-01-30 RX ADMIN — Medication 81 MILLIGRAM(S): at 11:06

## 2021-01-30 RX ADMIN — PIPERACILLIN AND TAZOBACTAM 25 GRAM(S): 4; .5 INJECTION, POWDER, LYOPHILIZED, FOR SOLUTION INTRAVENOUS at 19:41

## 2021-01-30 RX ADMIN — Medication 1200 MILLIGRAM(S): at 06:24

## 2021-01-30 RX ADMIN — Medication 250 MILLIGRAM(S): at 17:21

## 2021-01-30 RX ADMIN — ENOXAPARIN SODIUM 40 MILLIGRAM(S): 100 INJECTION SUBCUTANEOUS at 11:06

## 2021-01-30 RX ADMIN — Medication 4 MILLIGRAM(S): at 21:18

## 2021-01-30 RX ADMIN — PIPERACILLIN AND TAZOBACTAM 25 GRAM(S): 4; .5 INJECTION, POWDER, LYOPHILIZED, FOR SOLUTION INTRAVENOUS at 11:07

## 2021-01-30 RX ADMIN — Medication 650 MILLIGRAM(S): at 06:30

## 2021-01-30 RX ADMIN — AMLODIPINE BESYLATE 2.5 MILLIGRAM(S): 2.5 TABLET ORAL at 06:24

## 2021-01-30 NOTE — PROGRESS NOTE ADULT - ASSESSMENT
75M with PMH of HTN, recent dx of COVID s/p monoclonal ab infusion who presents with worsening dyspnea, found to have possible superimposed bacterial PNA.     Problem/Plan - 1:  ·  Problem: Bacterial pneumonia.  Plan: Acute hypoxic respiratory failure, in setting of COVID 19, likely superimposed infection   - will c/w abx for now. Plan to treat for 5 day course.  - Tessalon perles TIS, mucinex BID ordered. Incentive spirometry, OOB  - DC steroids  - Wean O2 as tolerated.      Problem/Plan - 2:  ·  Problem: Pneumonia due to COVID-19 virus.  Plan: s/p Monoclonal ab as well as 7 day course of decadron as outpatient. Received additional decadron dose in ED as well.   - Will hold Remdesivir given RAZ and that pt was dx>10 days ago.  - Will hold further steroid dosing given concern for possible bacterial PNA  - Trend inflammatory markers  - Titrate off O2 as tolerated.      Problem/Plan - 3:  ·  Problem: Acute kidney injury.  Plan: Cr stable f  - ACE-I held. Baseline Cr appears to be 1.2-1.4 as per outpatient lab review and outpatient documentation from dr. Villanueva. Cr improving from outpatient value of 1.8  - Continue to monitor Cr  - Monitor UOP.      Problem/Plan - 4:  ·  Problem: Essential hypertension.  Plan: c/w Norvasc  Monitor BP.      Problem/Plan - 5:  ·  Problem: Need for prophylactic measure.  Plan: Lovenox for DVT ppx.      75M with PMH of HTN, recent dx of COVID s/p monoclonal ab infusion who presents with worsening dyspnea, found to have possible superimposed bacterial PNA.     Problem/Plan - 1:  ·  Problem: Bacterial pneumonia.  Plan: Acute hypoxic respiratory failure, in setting of COVID 19, likely superimposed infection   - will c/w abx for now. Plan to treat for 5 day course.  - Tessalon perles TIS, mucinex BID ordered. Incentive spirometry, OOB  - DC steroids  - Wean O2 as tolerated.      Problem/Plan - 2:  ·  Problem: Pneumonia due to COVID-19 virus.  Plan: s/p Monoclonal ab as well as 7 day course of decadron as outpatient. Received additional decadron dose in ED as well.   - CT chest on 1/27 could not r/o PE; if hypoxia persists, will consider repeat.   - Will hold Remdesivir given RAZ and that pt was dx>10 days ago.  - Will hold further steroid dosing given concern for possible bacterial PNA  - Trend inflammatory markers  - Titrate off O2 as tolerated.   - Given persistent fevers, and likelyhood of staph aureus as etiology of superimposed PNA, will add vancomycin  - Consider ID consult in AM     Problem/Plan - 3:  ·  Problem: Acute kidney injury.  Plan: Cr stable, baseline 1.8 per pt's PCP  - ACE-I held.- Continue to monitor Cr  - Monitor UOP.      Problem/Plan - 4:  ·  Problem: Essential hypertension.  Plan: c/w Norvasc  Monitor BP.      Problem/Plan - 5:  ·  Problem: Need for prophylactic measure.  Plan: Lovenox for DVT ppx.

## 2021-01-30 NOTE — PROGRESS NOTE ADULT - ATTENDING COMMENTS
Plan of care discussed at length with pt's son who is a dentist Jamey. All questions/concerns addressed Plan of care discussed at length with pt's son who is a dentist Jamey, and PMD Dr Paz [phone]. All questions/concerns addressed

## 2021-01-31 LAB
ANION GAP SERPL CALC-SCNC: 13 MMOL/L — SIGNIFICANT CHANGE UP (ref 5–17)
BUN SERPL-MCNC: 20 MG/DL — SIGNIFICANT CHANGE UP (ref 7–23)
CALCIUM SERPL-MCNC: 8.1 MG/DL — LOW (ref 8.4–10.5)
CHLORIDE SERPL-SCNC: 97 MMOL/L — SIGNIFICANT CHANGE UP (ref 96–108)
CO2 SERPL-SCNC: 22 MMOL/L — SIGNIFICANT CHANGE UP (ref 22–31)
CREAT SERPL-MCNC: 1.33 MG/DL — HIGH (ref 0.5–1.3)
D DIMER BLD IA.RAPID-MCNC: 365 NG/ML DDU — HIGH
GLUCOSE SERPL-MCNC: 86 MG/DL — SIGNIFICANT CHANGE UP (ref 70–99)
HCT VFR BLD CALC: 31.3 % — LOW (ref 39–50)
HGB BLD-MCNC: 10.5 G/DL — LOW (ref 13–17)
MCHC RBC-ENTMCNC: 29.7 PG — SIGNIFICANT CHANGE UP (ref 27–34)
MCHC RBC-ENTMCNC: 33.5 GM/DL — SIGNIFICANT CHANGE UP (ref 32–36)
MCV RBC AUTO: 88.4 FL — SIGNIFICANT CHANGE UP (ref 80–100)
NRBC # BLD: 0 /100 WBCS — SIGNIFICANT CHANGE UP (ref 0–0)
PLATELET # BLD AUTO: 249 K/UL — SIGNIFICANT CHANGE UP (ref 150–400)
POTASSIUM SERPL-MCNC: 4 MMOL/L — SIGNIFICANT CHANGE UP (ref 3.5–5.3)
POTASSIUM SERPL-SCNC: 4 MMOL/L — SIGNIFICANT CHANGE UP (ref 3.5–5.3)
RBC # BLD: 3.54 M/UL — LOW (ref 4.2–5.8)
RBC # FLD: 13.6 % — SIGNIFICANT CHANGE UP (ref 10.3–14.5)
SODIUM SERPL-SCNC: 132 MMOL/L — LOW (ref 135–145)
WBC # BLD: 9.17 K/UL — SIGNIFICANT CHANGE UP (ref 3.8–10.5)
WBC # FLD AUTO: 9.17 K/UL — SIGNIFICANT CHANGE UP (ref 3.8–10.5)

## 2021-01-31 PROCEDURE — 99233 SBSQ HOSP IP/OBS HIGH 50: CPT | Mod: CS

## 2021-01-31 RX ORDER — ALBUTEROL 90 UG/1
2 AEROSOL, METERED ORAL EVERY 6 HOURS
Refills: 0 | Status: DISCONTINUED | OUTPATIENT
Start: 2021-01-31 | End: 2021-02-03

## 2021-01-31 RX ORDER — SENNA PLUS 8.6 MG/1
2 TABLET ORAL AT BEDTIME
Refills: 0 | Status: DISCONTINUED | OUTPATIENT
Start: 2021-01-31 | End: 2021-02-03

## 2021-01-31 RX ADMIN — Medication 40 MILLIGRAM(S): at 18:54

## 2021-01-31 RX ADMIN — Medication 100 MILLIGRAM(S): at 05:05

## 2021-01-31 RX ADMIN — Medication 100 MILLIGRAM(S): at 22:13

## 2021-01-31 RX ADMIN — Medication 250 MILLIGRAM(S): at 18:02

## 2021-01-31 RX ADMIN — AMLODIPINE BESYLATE 2.5 MILLIGRAM(S): 2.5 TABLET ORAL at 05:05

## 2021-01-31 RX ADMIN — PIPERACILLIN AND TAZOBACTAM 25 GRAM(S): 4; .5 INJECTION, POWDER, LYOPHILIZED, FOR SOLUTION INTRAVENOUS at 11:31

## 2021-01-31 RX ADMIN — POLYETHYLENE GLYCOL 3350 17 GRAM(S): 17 POWDER, FOR SOLUTION ORAL at 11:53

## 2021-01-31 RX ADMIN — PIPERACILLIN AND TAZOBACTAM 25 GRAM(S): 4; .5 INJECTION, POWDER, LYOPHILIZED, FOR SOLUTION INTRAVENOUS at 18:00

## 2021-01-31 RX ADMIN — SENNA PLUS 2 TABLET(S): 8.6 TABLET ORAL at 22:13

## 2021-01-31 RX ADMIN — ENOXAPARIN SODIUM 40 MILLIGRAM(S): 100 INJECTION SUBCUTANEOUS at 11:31

## 2021-01-31 RX ADMIN — Medication 81 MILLIGRAM(S): at 11:31

## 2021-01-31 RX ADMIN — Medication 1200 MILLIGRAM(S): at 05:05

## 2021-01-31 RX ADMIN — Medication 250 MILLIGRAM(S): at 06:25

## 2021-01-31 RX ADMIN — Medication 4 MILLIGRAM(S): at 22:13

## 2021-01-31 RX ADMIN — PIPERACILLIN AND TAZOBACTAM 25 GRAM(S): 4; .5 INJECTION, POWDER, LYOPHILIZED, FOR SOLUTION INTRAVENOUS at 04:30

## 2021-01-31 RX ADMIN — Medication 1200 MILLIGRAM(S): at 17:59

## 2021-01-31 NOTE — PROGRESS NOTE ADULT - ASSESSMENT
75M with PMH of HTN, recent dx of COVID s/p monoclonal ab infusion who presents with worsening dyspnea, found to have possible superimposed bacterial PNA.     Problem/Plan - 1:  ·  Problem: Bacterial pneumonia.  Plan: Acute hypoxic respiratory failure, in setting of COVID 19, likely superimposed infection   - on zosyn day 4/7; vancomycin added 1/30 due to persistent symptoms to cover MRSA  - Tessalon perles TIS, mucinex BID ordered. Incentive spirometry, OOB, albuterol MDI  - Per conversation with son, will start steroid taper - pt completed 12 day course of decadron as outpatient; started on prednisone 40mg today  - Continue supplemental O2  - If hypoxia worsens, repeat CT angio (last CT chest on 1/27 could not r/o PE; not urgent to repeat due to renal insufficiency)  - Pts PMD Dr Paz requesting pulm consult Dr Joseph Mejia, states will call the herself; please followup recs     Problem/Plan - 2:  ·  Problem: Pneumonia due to COVID-19 virus.  Plan: s/p Monoclonal ab as well as 7 day course of decadron as outpatient. Received additional decadron dose in ED as well.   - Will hold Remdesivir given RAZ and that pt was dx>10 days ago.  - Trend inflammatory markers     Problem/Plan - 3:  ·  Problem: Acute kidney injury.  Plan: Cr stable, improved today to 1.3, baseline 1.8 per pt's PCP  - ACE-I held.- Continue to monitor Cr  - Monitor UOP.      Problem/Plan - 4:  ·  Problem: Essential hypertension.  Plan: c/w Norvasc  BP at goal     Problem/Plan - 5:  ·  Problem: Need for prophylactic measure.  Plan: Lovenox for DVT ppx.   Diet: add ensure TID to meals due to poor appetite  Bowel regimen: on miralax PRN; started senna QHS as pt reports no BM since 1/29     75M with PMH of HTN, recent dx of COVID s/p monoclonal ab infusion who presents with worsening dyspnea, found to have possible superimposed bacterial PNA.     Problem/Plan - 1:  ·  Problem: Bacterial pneumonia.  Plan: Acute hypoxic respiratory failure, in setting of COVID 19, likely superimposed infection   - on zosyn day 4/7; vancomycin added 1/30 due to persistent symptoms to cover MRSA; vanc trough due before 1/30 AM dose  - Tessalon perles TIS, mucinex BID ordered. Incentive spirometry, OOB, albuterol MDI  - Per conversation with son, will start steroid taper - pt completed 12 day course of decadron as outpatient; started on prednisone 40mg today  - Continue supplemental O2  - If hypoxia worsens, repeat CT angio (last CT chest on 1/27 could not r/o PE; not urgent to repeat due to renal insufficiency)  - Encouraged use of incentive spirometer  - Pts PMD Dr Paz requesting pulm consult Dr Joseph Mejia, states will call the herself; please followup recs     Problem/Plan - 2:  ·  Problem: Pneumonia due to COVID-19 virus.  Plan: s/p Monoclonal ab as well as 7 day course of decadron as outpatient. Received additional decadron dose in ED as well.   - Will hold Remdesivir given RAZ and that pt was dx>10 days ago.  - Trend inflammatory markers     Problem/Plan - 3:  ·  Problem: Acute kidney injury.  Plan: Cr stable, improved today to 1.3, baseline 1.8 per pt's PCP  - ACE-I held.- Continue to monitor Cr  - Monitor UOP.      Problem/Plan - 4:  ·  Problem: Essential hypertension.  Plan: c/w Norvasc  BP at goal     Problem/Plan - 5:  ·  Problem: Need for prophylactic measure.  Plan: Lovenox for DVT ppx.   Diet: add ensure TID to meals due to poor appetite  Bowel regimen: on miralax PRN; started senna QHS as pt reports no BM since 1/29  Activity: OOB to chair     75M with PMH of HTN, recent dx of COVID s/p monoclonal ab infusion who presents with worsening dyspnea, found to have possible superimposed bacterial PNA.     Problem/Plan - 1:  ·  Problem: Bacterial pneumonia.  Plan: Acute hypoxic respiratory failure, in setting of COVID 19, likely superimposed infection   - on zosyn day 4/7; vancomycin added 1/30 due to persistent symptoms to cover MRSA; vanc trough due before 1/30 AM dose  - Tessalon perles TIS, mucinex BID ordered. Incentive spirometry, OOB, albuterol MDI  - Per conversation with son, will start steroid taper - pt completed 12 day course of decadron as outpatient; started on prednisone 40mg today  - Trend inflammatory markers Q72hrs  - Continue supplemental O2  - If hypoxia worsens, repeat CT angio (last CT chest on 1/27 could not r/o PE; not urgent to repeat due to renal insufficiency)  - Encouraged use of incentive spirometer  - Pts PMD Dr Paz requesting pulm consult Dr Joseph Mejia, states will call the herself; please followup recs     Problem/Plan - 2:  ·  Problem: Pneumonia due to COVID-19 virus.  Plan: s/p Monoclonal ab as well as 7 day course of decadron as outpatient. Received additional decadron dose in ED as well.   - Will hold Remdesivir given RAZ and that pt was dx>10 days ago.  - Trend inflammatory markers     Problem/Plan - 3:  ·  Problem: Acute kidney injury.  Plan: Cr stable, improved today to 1.3, baseline 1.8 per pt's PCP  - ACE-I held.- Continue to monitor Cr  - Monitor UOP.      Problem/Plan - 4:  ·  Problem: Essential hypertension.  Plan: c/w Norvasc  BP at goal     Problem/Plan - 5:  ·  Problem: Need for prophylactic measure.  Plan: Lovenox for DVT ppx.   Diet: add ensure TID to meals due to poor appetite  Bowel regimen: on miralax PRN; started senna QHS as pt reports no BM since 1/29  Activity: OOB to chair

## 2021-01-31 NOTE — PROGRESS NOTE ADULT - ATTENDING COMMENTS
Spoke extensively >1hr once again to pt's PMD Dr Paz and son Jamey Mathewmark both while with pt at bedside and later, provided extensive updates with specific lab values; thoroughly addressed all questions/concerns to their verbalized satisfaction.

## 2021-02-01 DIAGNOSIS — U07.1 COVID-19: ICD-10-CM

## 2021-02-01 DIAGNOSIS — G47.33 OBSTRUCTIVE SLEEP APNEA (ADULT) (PEDIATRIC): ICD-10-CM

## 2021-02-01 DIAGNOSIS — R09.02 HYPOXEMIA: ICD-10-CM

## 2021-02-01 LAB
ALBUMIN SERPL ELPH-MCNC: 3.2 G/DL — LOW (ref 3.3–5)
ALP SERPL-CCNC: 43 U/L — SIGNIFICANT CHANGE UP (ref 40–120)
ALT FLD-CCNC: 19 U/L — SIGNIFICANT CHANGE UP (ref 10–45)
ANION GAP SERPL CALC-SCNC: 11 MMOL/L — SIGNIFICANT CHANGE UP (ref 5–17)
AST SERPL-CCNC: 26 U/L — SIGNIFICANT CHANGE UP (ref 10–40)
BASOPHILS # BLD AUTO: 0.01 K/UL — SIGNIFICANT CHANGE UP (ref 0–0.2)
BASOPHILS NFR BLD AUTO: 0.1 % — SIGNIFICANT CHANGE UP (ref 0–2)
BILIRUB SERPL-MCNC: 0.3 MG/DL — SIGNIFICANT CHANGE UP (ref 0.2–1.2)
BUN SERPL-MCNC: 21 MG/DL — SIGNIFICANT CHANGE UP (ref 7–23)
CALCIUM SERPL-MCNC: 8.4 MG/DL — SIGNIFICANT CHANGE UP (ref 8.4–10.5)
CHLORIDE SERPL-SCNC: 99 MMOL/L — SIGNIFICANT CHANGE UP (ref 96–108)
CO2 SERPL-SCNC: 25 MMOL/L — SIGNIFICANT CHANGE UP (ref 22–31)
CREAT SERPL-MCNC: 1.37 MG/DL — HIGH (ref 0.5–1.3)
EOSINOPHIL # BLD AUTO: 0 K/UL — SIGNIFICANT CHANGE UP (ref 0–0.5)
EOSINOPHIL NFR BLD AUTO: 0 % — SIGNIFICANT CHANGE UP (ref 0–6)
GLUCOSE SERPL-MCNC: 187 MG/DL — HIGH (ref 70–99)
GRAM STN FLD: SIGNIFICANT CHANGE UP
HCT VFR BLD CALC: 34.8 % — LOW (ref 39–50)
HGB BLD-MCNC: 11.3 G/DL — LOW (ref 13–17)
IMM GRANULOCYTES NFR BLD AUTO: 0.5 % — SIGNIFICANT CHANGE UP (ref 0–1.5)
LYMPHOCYTES # BLD AUTO: 0.65 K/UL — LOW (ref 1–3.3)
LYMPHOCYTES # BLD AUTO: 8.6 % — LOW (ref 13–44)
MCHC RBC-ENTMCNC: 29 PG — SIGNIFICANT CHANGE UP (ref 27–34)
MCHC RBC-ENTMCNC: 32.5 GM/DL — SIGNIFICANT CHANGE UP (ref 32–36)
MCV RBC AUTO: 89.2 FL — SIGNIFICANT CHANGE UP (ref 80–100)
MONOCYTES # BLD AUTO: 0.28 K/UL — SIGNIFICANT CHANGE UP (ref 0–0.9)
MONOCYTES NFR BLD AUTO: 3.7 % — SIGNIFICANT CHANGE UP (ref 2–14)
NEUTROPHILS # BLD AUTO: 6.6 K/UL — SIGNIFICANT CHANGE UP (ref 1.8–7.4)
NEUTROPHILS NFR BLD AUTO: 87.1 % — HIGH (ref 43–77)
NRBC # BLD: 0 /100 WBCS — SIGNIFICANT CHANGE UP (ref 0–0)
PLATELET # BLD AUTO: 268 K/UL — SIGNIFICANT CHANGE UP (ref 150–400)
POTASSIUM SERPL-MCNC: 4.4 MMOL/L — SIGNIFICANT CHANGE UP (ref 3.5–5.3)
POTASSIUM SERPL-SCNC: 4.4 MMOL/L — SIGNIFICANT CHANGE UP (ref 3.5–5.3)
PROT SERPL-MCNC: 7.1 G/DL — SIGNIFICANT CHANGE UP (ref 6–8.3)
RBC # BLD: 3.9 M/UL — LOW (ref 4.2–5.8)
RBC # FLD: 13.6 % — SIGNIFICANT CHANGE UP (ref 10.3–14.5)
SODIUM SERPL-SCNC: 135 MMOL/L — SIGNIFICANT CHANGE UP (ref 135–145)
SPECIMEN SOURCE: SIGNIFICANT CHANGE UP
VANCOMYCIN TROUGH SERPL-MCNC: 14.8 UG/ML — SIGNIFICANT CHANGE UP (ref 10–20)
WBC # BLD: 7.58 K/UL — SIGNIFICANT CHANGE UP (ref 3.8–10.5)
WBC # FLD AUTO: 7.58 K/UL — SIGNIFICANT CHANGE UP (ref 3.8–10.5)

## 2021-02-01 PROCEDURE — 99233 SBSQ HOSP IP/OBS HIGH 50: CPT | Mod: CS

## 2021-02-01 RX ORDER — SODIUM CHLORIDE 9 MG/ML
1000 INJECTION INTRAMUSCULAR; INTRAVENOUS; SUBCUTANEOUS
Refills: 0 | Status: DISCONTINUED | OUTPATIENT
Start: 2021-02-01 | End: 2021-02-03

## 2021-02-01 RX ADMIN — Medication 100 MILLIGRAM(S): at 21:51

## 2021-02-01 RX ADMIN — ALBUTEROL 2 PUFF(S): 90 AEROSOL, METERED ORAL at 12:19

## 2021-02-01 RX ADMIN — Medication 1200 MILLIGRAM(S): at 06:23

## 2021-02-01 RX ADMIN — PIPERACILLIN AND TAZOBACTAM 25 GRAM(S): 4; .5 INJECTION, POWDER, LYOPHILIZED, FOR SOLUTION INTRAVENOUS at 02:24

## 2021-02-01 RX ADMIN — AMLODIPINE BESYLATE 2.5 MILLIGRAM(S): 2.5 TABLET ORAL at 06:23

## 2021-02-01 RX ADMIN — ALBUTEROL 2 PUFF(S): 90 AEROSOL, METERED ORAL at 17:18

## 2021-02-01 RX ADMIN — ENOXAPARIN SODIUM 40 MILLIGRAM(S): 100 INJECTION SUBCUTANEOUS at 11:34

## 2021-02-01 RX ADMIN — PIPERACILLIN AND TAZOBACTAM 25 GRAM(S): 4; .5 INJECTION, POWDER, LYOPHILIZED, FOR SOLUTION INTRAVENOUS at 18:00

## 2021-02-01 RX ADMIN — Medication 250 MILLIGRAM(S): at 06:24

## 2021-02-01 RX ADMIN — SENNA PLUS 2 TABLET(S): 8.6 TABLET ORAL at 21:51

## 2021-02-01 RX ADMIN — Medication 100 MILLIGRAM(S): at 11:58

## 2021-02-01 RX ADMIN — Medication 81 MILLIGRAM(S): at 11:35

## 2021-02-01 RX ADMIN — Medication 4 MILLIGRAM(S): at 21:51

## 2021-02-01 RX ADMIN — Medication 1200 MILLIGRAM(S): at 17:17

## 2021-02-01 RX ADMIN — PIPERACILLIN AND TAZOBACTAM 25 GRAM(S): 4; .5 INJECTION, POWDER, LYOPHILIZED, FOR SOLUTION INTRAVENOUS at 11:58

## 2021-02-01 RX ADMIN — Medication 100 MILLIGRAM(S): at 06:23

## 2021-02-01 NOTE — CONSULT NOTE ADULT - PROBLEM SELECTOR RECOMMENDATION 9
+COVID PCR  -S/p Regeneron 1/19  -Already completed 10 days decadron. Can d/c prednisone, no clear benefit in continuing steroids past 10 days in the absence of underlying lung disease  -Remdesivir unlikely to have any benefit at this point  -Trend inflammatory markers

## 2021-02-01 NOTE — PROGRESS NOTE ADULT - ASSESSMENT
75M with PMH of HTN, recent dx of COVID s/p monoclonal ab infusion who presents with worsening dyspnea, found to have possible superimposed bacterial PNA.     Problem/Plan - 1:  ·  Problem: Bacterial pneumonia.  Plan: Acute hypoxic respiratory failure, in setting of COVID 19, likely superimposed infection   - on zosyn day 5/7; d/c vancomycin today (unlikely to have MRSA infection).  - Tessalon perles TID, Mucinex BID ordered. Incentive spirometry, OOB, albuterol MDI  - Per pulm discontinued prednisone.   - Trend inflammatory markers Q72hrs (check CRP, ferritin, d-dimer tomorrow)  - Continue supplemental O2  - If hypoxia worsens, repeat CT angio (last CT chest on 1/27 could not r/o PE; not urgent to repeat due to renal insufficiency)  - Encouraged use of incentive spirometer  - Pt's PMD Dr. Paz requesting pulm consult Dr. Joseph Mejia     Problem/Plan - 2:  ·  Problem: Pneumonia due to COVID-19 virus.  Plan: s/p Monoclonal ab as well as 7 day course of decadron as outpatient. Received additional decadron dose in ED as well.   - Remdesivir held given RAZ and that pt was dx>10 days ago.  - Trend inflammatory markers     Problem/Plan - 3:  ·  Problem: Acute kidney injury.  Plan: Cr stable, improved to 1.3, baseline 1.8 per pt's PCP  - ACE-I held.- Continue to monitor Cr  - Monitor UOP.      Problem/Plan - 4:  ·  Problem: Essential hypertension.  Plan: c/w Norvasc  BP at goal     Problem/Plan - 5:  ·  Problem: Need for prophylactic measure.  Plan: Lovenox for DVT ppx.   Diet: added glucerna TID to meals due to poor appetite  Bowel regimen: on miralax PRN  Activity: OOB to chair - ambulating independently in the room

## 2021-02-01 NOTE — CONSULT NOTE ADULT - ASSESSMENT
74 y/o Mexican speaking M with PMH NILA (uses oral appliance), essential HTN, presumed BPH, hyperlipidemia not currently on Rx. Information obtained from son Jamey via phone as per pts request. Presents to ED with hypoxia, dyspnea and persistent fevers. He reportedly was told on 1/15/21 that he was exposed to COVID, tested positive on 1/18/21. Was sent for monoclonal antibodies and received Regeneron on 1/19 and started Decadron 6mg PO qd. CTA chest neg PE, +subtle GGO and RLL opacity.  76 y/o [place]an speaking M with PMH NILA (uses oral appliance), essential HTN, presumed BPH, hyperlipidemia not currently on Rx. Information obtained from son Jamey via phone as per pts request. Presents to ED with hypoxia, dyspnea and persistent fevers. He reportedly was told on 1/15/21 that he was exposed to COVID, tested positive on 1/18/21. Was sent for monoclonal antibodies and received Regeneron on 1/19 and started Decadron 6mg PO qd. CTA chest neg PE, +subtle GGO and RLL opacity concerning for superimposed bacterial PNA.

## 2021-02-01 NOTE — CONSULT NOTE ADULT - SUBJECTIVE AND OBJECTIVE BOX
PULMONARY CONSULT    HPI: 74 y/o Cameroonian speaking M with PMH NILA (uses oral appliance), essential HTN, presumed BPH, hyperlipidemia not currently on Rx. Information obtained from son Jamey via phone as per pts request. Presents to ED with hypoxia, dyspnea and persistent fevers. He reportedly was told on 1/15/21 that he was exposed to COVID, tested positive on 1/18/21. Was sent for monoclonal antibodies and received Regeneron on 1/19 and started Decadron 6mg PO qd. CTA chest neg PE, +subtle GGO and RLL opacity.     PAST MEDICAL & SURGICAL HISTORY:  Lipidemia  History of BPH  HTN (hypertension)  No significant past surgical history    Allergies  sulfa drugs (Other)    FAMILY HISTORY:  Family history of essential hypertension    Social history: never a smoker     Review of Systems:  CONSTITUTIONAL: Per above  EYES: No eye pain, visual disturbances, or discharge  ENMT:  No difficulty hearing, tinnitus, vertigo; No sinus or throat pain  NECK: No pain or stiffness  RESPIRATORY: Per above  CARDIOVASCULAR: No chest pain, palpitations, dizziness, or leg swelling  GASTROINTESTINAL: No abdominal or epigastric pain. No nausea, vomiting, or hematemesis; No diarrhea or constipation. No melena or hematochezia.  GENITOURINARY: No dysuria, frequency, hematuria, or incontinence  NEUROLOGICAL: No headaches, memory loss, loss of strength, numbness, or tremors  SKIN: No itching, burning, rashes, or lesions   MUSCULOSKELETAL: No joint pain or swelling; No muscle, back, or extremity pain  PSYCHIATRIC: No depression, anxiety, mood swings, or difficulty sleeping      Medications:  MEDICATIONS  (STANDING):  ALBUTerol    90 MICROgram(s) HFA Inhaler 2 Puff(s) Inhalation every 6 hours  amLODIPine   Tablet 2.5 milliGRAM(s) Oral daily  aspirin enteric coated 81 milliGRAM(s) Oral daily  benzonatate 100 milliGRAM(s) Oral every 8 hours  doxazosin 4 milliGRAM(s) Oral at bedtime  enoxaparin Injectable 40 milliGRAM(s) SubCutaneous daily  guaiFENesin ER 1200 milliGRAM(s) Oral every 12 hours  piperacillin/tazobactam IVPB.. 3.375 Gram(s) IV Intermittent every 8 hours  predniSONE   Tablet 40 milliGRAM(s) Oral daily  senna 2 Tablet(s) Oral at bedtime  sodium chloride 0.9%. 1000 milliLiter(s) (30 mL/Hr) IV Continuous <Continuous>  vancomycin  IVPB      vancomycin  IVPB 1250 milliGRAM(s) IV Intermittent every 12 hours    MEDICATIONS  (PRN):  acetaminophen   Tablet .. 650 milliGRAM(s) Oral every 4 hours PRN Temp greater or equal to 38.5C (101.3F)  polyethylene glycol 3350 17 Gram(s) Oral daily PRN Constipation            Vital Signs Last 24 Hrs  T(C): 36.6 (01 Feb 2021 11:15), Max: 36.9 (31 Jan 2021 21:14)  T(F): 97.8 (01 Feb 2021 11:15), Max: 98.5 (31 Jan 2021 21:14)  HR: 87 (01 Feb 2021 11:15) (87 - 102)  BP: 145/71 (01 Feb 2021 11:15) (126/64 - 157/78)  BP(mean): --  RR: 18 (01 Feb 2021 11:15) (18 - 19)  SpO2: 94% (01 Feb 2021 11:15) (92% - 95%)            01-31 @ 07:01  -  02-01 @ 07:00  --------------------------------------------------------  IN: 1560 mL / OUT: 3225 mL / NET: -1665 mL          LABS:                        11.3   7.58  )-----------( 268      ( 01 Feb 2021 04:46 )             34.8     02-01    135  |  99  |  21  ----------------------------<  187<H>  4.4   |  25  |  1.37<H>    Ca    8.4      01 Feb 2021 04:46    TPro  7.1  /  Alb  3.2<L>  /  TBili  0.3  /  DBili  x   /  AST  26  /  ALT  19  /  AlkPhos  43  02-01          CAPILLARY BLOOD GLUCOSE      POCT Blood Glucose.: 178 mg/dL (01 Feb 2021 04:21)              CULTURES:      (collected 01-28-21 @ 00:27)  Source: .Blood Blood-Venous  Preliminary Report (01-29-21 @ 01:03):    No growth to date.     (collected 01-28-21 @ 00:27)  Source: .Blood Blood-Venous  Preliminary Report (01-29-21 @ 01:03):    No growth to date.            Physical Examination:    General: No acute distress.      HEENT: Pupils equal, reactive to light.  Symmetric.    PULM: decreased BS R base     CVS: S1, S2    ABD: Soft, nondistended, nontender, normoactive bowel sounds, no masses    EXT: No edema, nontender    SKIN: Warm and well perfused, no rashes noted.    NEURO: Alert, oriented, interactive, nonfocal    RADIOLOGY REVIEWED  CT chest: < from: CT Angio Chest w/ IV Cont (01.27.21 @ 17:24) >    FINDINGS:    PULMONARY VESSELS: No pulmonary embolus within the main pulmonary arteries. The lobar, segmental and subsegmental branches are not evaluated secondary to motion and streak artifact caused by patient's arms. Main pulmonary artery normal in diameter.    HEART AND VASCULATURE: Heart size is normal. No pericardial effusion. No aortic aneurysm or dissection.    LUNGS, AIRWAYS, PLEURA: Patent central airways. Minimal peripheral opacities within right middle lobe. Heterogeneous consolidation of right lower lobe can represent superimposed bacterial infection or pulmonary infarct. No pleural effusions or pneumothorax.    MEDIASTINUM: Mildly enlarged right hilar lymph node measuring 1.5 cm in short axis.    UPPER ABDOMEN: The righthemidiaphragm is elevated.    BONES AND SOFT TISSUES: No aggressive osseous lesion.    LOWER NECK: Within normal limits.    IMPRESSION:    Significantly limited exam.  The lobar, segmental and subsegmental branches of the pulmonary arteries are not evaluated secondary to motion and streak artifact caused by patient's arms position.    Minimal peripheral opacities within right middle lobe can represent COVID19 infection.    Heterogeneous consolidation of right lower lobe can represent superimposedbacterial pneumonia or pulmonary infarct. Recommend repeat of the exam to rule out pulmonary embolus in the smaller pulmonary arteries.        < end of copied text >   PULMONARY CONSULT    HPI: 76 y/o Swiss speaking M with PMH NILA (uses oral appliance), essential HTN, presumed BPH, hyperlipidemia not currently on Rx. Information obtained from son Jamey via phone as per pts request. Presents to ED with hypoxia, dyspnea and persistent fevers. He reportedly was told on 1/15/21 that he was exposed to COVID, tested positive on 1/18/21. Was sent for monoclonal antibodies and received Regeneron on 1/19 and started Decadron 6mg PO qd. CTA chest neg PE, +subtle GGO and RLL opacity. Denies CP, pleuritic CP.    PAST MEDICAL & SURGICAL HISTORY:  Lipidemia  History of BPH  HTN (hypertension)  No significant past surgical history    Allergies  sulfa drugs (Other)    FAMILY HISTORY:  Family history of essential hypertension    Social history: never a smoker     Review of Systems:  CONSTITUTIONAL: Per above  EYES: No eye pain, visual disturbances, or discharge  ENMT:  No difficulty hearing, tinnitus, vertigo; No sinus or throat pain  NECK: No pain or stiffness  RESPIRATORY: Per above  CARDIOVASCULAR: No chest pain, palpitations, dizziness, or leg swelling  GASTROINTESTINAL: No abdominal or epigastric pain. No nausea, vomiting, or hematemesis; No diarrhea or constipation. No melena or hematochezia.  GENITOURINARY: No dysuria, frequency, hematuria, or incontinence  NEUROLOGICAL: No headaches, memory loss, loss of strength, numbness, or tremors  SKIN: No itching, burning, rashes, or lesions   MUSCULOSKELETAL: No joint pain or swelling; No muscle, back, or extremity pain  PSYCHIATRIC: No depression, anxiety, mood swings, or difficulty sleeping      Medications:  MEDICATIONS  (STANDING):  ALBUTerol    90 MICROgram(s) HFA Inhaler 2 Puff(s) Inhalation every 6 hours  amLODIPine   Tablet 2.5 milliGRAM(s) Oral daily  aspirin enteric coated 81 milliGRAM(s) Oral daily  benzonatate 100 milliGRAM(s) Oral every 8 hours  doxazosin 4 milliGRAM(s) Oral at bedtime  enoxaparin Injectable 40 milliGRAM(s) SubCutaneous daily  guaiFENesin ER 1200 milliGRAM(s) Oral every 12 hours  piperacillin/tazobactam IVPB.. 3.375 Gram(s) IV Intermittent every 8 hours  predniSONE   Tablet 40 milliGRAM(s) Oral daily  senna 2 Tablet(s) Oral at bedtime  sodium chloride 0.9%. 1000 milliLiter(s) (30 mL/Hr) IV Continuous <Continuous>  vancomycin  IVPB      vancomycin  IVPB 1250 milliGRAM(s) IV Intermittent every 12 hours    MEDICATIONS  (PRN):  acetaminophen   Tablet .. 650 milliGRAM(s) Oral every 4 hours PRN Temp greater or equal to 38.5C (101.3F)  polyethylene glycol 3350 17 Gram(s) Oral daily PRN Constipation            Vital Signs Last 24 Hrs  T(C): 36.6 (01 Feb 2021 11:15), Max: 36.9 (31 Jan 2021 21:14)  T(F): 97.8 (01 Feb 2021 11:15), Max: 98.5 (31 Jan 2021 21:14)  HR: 87 (01 Feb 2021 11:15) (87 - 102)  BP: 145/71 (01 Feb 2021 11:15) (126/64 - 157/78)  BP(mean): --  RR: 18 (01 Feb 2021 11:15) (18 - 19)  SpO2: 94% (01 Feb 2021 11:15) (92% - 95%)            01-31 @ 07:01  -  02-01 @ 07:00  --------------------------------------------------------  IN: 1560 mL / OUT: 3225 mL / NET: -1665 mL          LABS:                        11.3   7.58  )-----------( 268      ( 01 Feb 2021 04:46 )             34.8     02-01    135  |  99  |  21  ----------------------------<  187<H>  4.4   |  25  |  1.37<H>    Ca    8.4      01 Feb 2021 04:46    TPro  7.1  /  Alb  3.2<L>  /  TBili  0.3  /  DBili  x   /  AST  26  /  ALT  19  /  AlkPhos  43  02-01          CAPILLARY BLOOD GLUCOSE      POCT Blood Glucose.: 178 mg/dL (01 Feb 2021 04:21)              CULTURES:      (collected 01-28-21 @ 00:27)  Source: .Blood Blood-Venous  Preliminary Report (01-29-21 @ 01:03):    No growth to date.     (collected 01-28-21 @ 00:27)  Source: .Blood Blood-Venous  Preliminary Report (01-29-21 @ 01:03):    No growth to date.            Physical Examination:    General: No acute distress.      HEENT: Pupils equal, reactive to light.  Symmetric.    PULM: decreased BS R base     CVS: RRR    ABD: Soft, nondistended, nontender, normoactive bowel sounds, no masses    EXT: No edema, nontender    SKIN: Warm and well perfused, no rashes noted.    NEURO: Alert, oriented, interactive, nonfocal        RADIOLOGY REVIEWED  CT chest: < from: CT Angio Chest w/ IV Cont (01.27.21 @ 17:24) >    FINDINGS:    PULMONARY VESSELS: No pulmonary embolus within the main pulmonary arteries. The lobar, segmental and subsegmental branches are not evaluated secondary to motion and streak artifact caused by patient's arms. Main pulmonary artery normal in diameter.    HEART AND VASCULATURE: Heart size is normal. No pericardial effusion. No aortic aneurysm or dissection.    LUNGS, AIRWAYS, PLEURA: Patent central airways. Minimal peripheral opacities within right middle lobe. Heterogeneous consolidation of right lower lobe can represent superimposed bacterial infection or pulmonary infarct. No pleural effusions or pneumothorax.    MEDIASTINUM: Mildly enlarged right hilar lymph node measuring 1.5 cm in short axis.    UPPER ABDOMEN: The righthemidiaphragm is elevated.    BONES AND SOFT TISSUES: No aggressive osseous lesion.    LOWER NECK: Within normal limits.    IMPRESSION:    Significantly limited exam.  The lobar, segmental and subsegmental branches of the pulmonary arteries are not evaluated secondary to motion and streak artifact caused by patient's arms position.    Minimal peripheral opacities within right middle lobe can represent COVID19 infection.    Heterogeneous consolidation of right lower lobe can represent superimposedbacterial pneumonia or pulmonary infarct. Recommend repeat of the exam to rule out pulmonary embolus in the smaller pulmonary arteries.        < end of copied text >

## 2021-02-02 LAB
ALBUMIN SERPL ELPH-MCNC: 3 G/DL — LOW (ref 3.3–5)
ALP SERPL-CCNC: 42 U/L — SIGNIFICANT CHANGE UP (ref 40–120)
ALT FLD-CCNC: 18 U/L — SIGNIFICANT CHANGE UP (ref 10–45)
ANION GAP SERPL CALC-SCNC: 9 MMOL/L — SIGNIFICANT CHANGE UP (ref 5–17)
AST SERPL-CCNC: 26 U/L — SIGNIFICANT CHANGE UP (ref 10–40)
BASOPHILS # BLD AUTO: 0.02 K/UL — SIGNIFICANT CHANGE UP (ref 0–0.2)
BASOPHILS NFR BLD AUTO: 0.3 % — SIGNIFICANT CHANGE UP (ref 0–2)
BILIRUB SERPL-MCNC: 0.2 MG/DL — SIGNIFICANT CHANGE UP (ref 0.2–1.2)
BUN SERPL-MCNC: 23 MG/DL — SIGNIFICANT CHANGE UP (ref 7–23)
CALCIUM SERPL-MCNC: 8.5 MG/DL — SIGNIFICANT CHANGE UP (ref 8.4–10.5)
CHLORIDE SERPL-SCNC: 101 MMOL/L — SIGNIFICANT CHANGE UP (ref 96–108)
CO2 SERPL-SCNC: 26 MMOL/L — SIGNIFICANT CHANGE UP (ref 22–31)
CREAT SERPL-MCNC: 1.44 MG/DL — HIGH (ref 0.5–1.3)
CRP SERPL-MCNC: 6.18 MG/DL — HIGH (ref 0–0.4)
CULTURE RESULTS: SIGNIFICANT CHANGE UP
D DIMER BLD IA.RAPID-MCNC: 313 NG/ML DDU — HIGH
D DIMER BLD IA.RAPID-MCNC: 327 NG/ML DDU — HIGH
EOSINOPHIL # BLD AUTO: 0.11 K/UL — SIGNIFICANT CHANGE UP (ref 0–0.5)
EOSINOPHIL NFR BLD AUTO: 1.7 % — SIGNIFICANT CHANGE UP (ref 0–6)
FERRITIN SERPL-MCNC: 1160 NG/ML — HIGH (ref 30–400)
GLUCOSE SERPL-MCNC: 90 MG/DL — SIGNIFICANT CHANGE UP (ref 70–99)
HCT VFR BLD CALC: 29.9 % — LOW (ref 39–50)
HGB BLD-MCNC: 10 G/DL — LOW (ref 13–17)
IMM GRANULOCYTES NFR BLD AUTO: 0.3 % — SIGNIFICANT CHANGE UP (ref 0–1.5)
LYMPHOCYTES # BLD AUTO: 1.02 K/UL — SIGNIFICANT CHANGE UP (ref 1–3.3)
LYMPHOCYTES # BLD AUTO: 16 % — SIGNIFICANT CHANGE UP (ref 13–44)
MCHC RBC-ENTMCNC: 29.6 PG — SIGNIFICANT CHANGE UP (ref 27–34)
MCHC RBC-ENTMCNC: 33.4 GM/DL — SIGNIFICANT CHANGE UP (ref 32–36)
MCV RBC AUTO: 88.5 FL — SIGNIFICANT CHANGE UP (ref 80–100)
MONOCYTES # BLD AUTO: 0.58 K/UL — SIGNIFICANT CHANGE UP (ref 0–0.9)
MONOCYTES NFR BLD AUTO: 9.1 % — SIGNIFICANT CHANGE UP (ref 2–14)
NEUTROPHILS # BLD AUTO: 4.64 K/UL — SIGNIFICANT CHANGE UP (ref 1.8–7.4)
NEUTROPHILS NFR BLD AUTO: 72.6 % — SIGNIFICANT CHANGE UP (ref 43–77)
NRBC # BLD: 0 /100 WBCS — SIGNIFICANT CHANGE UP (ref 0–0)
PLATELET # BLD AUTO: 261 K/UL — SIGNIFICANT CHANGE UP (ref 150–400)
POTASSIUM SERPL-MCNC: 3.9 MMOL/L — SIGNIFICANT CHANGE UP (ref 3.5–5.3)
POTASSIUM SERPL-SCNC: 3.9 MMOL/L — SIGNIFICANT CHANGE UP (ref 3.5–5.3)
PROT SERPL-MCNC: 6.1 G/DL — SIGNIFICANT CHANGE UP (ref 6–8.3)
RBC # BLD: 3.38 M/UL — LOW (ref 4.2–5.8)
RBC # FLD: 13.7 % — SIGNIFICANT CHANGE UP (ref 10.3–14.5)
SODIUM SERPL-SCNC: 136 MMOL/L — SIGNIFICANT CHANGE UP (ref 135–145)
SPECIMEN SOURCE: SIGNIFICANT CHANGE UP
WBC # BLD: 6.39 K/UL — SIGNIFICANT CHANGE UP (ref 3.8–10.5)
WBC # FLD AUTO: 6.39 K/UL — SIGNIFICANT CHANGE UP (ref 3.8–10.5)

## 2021-02-02 PROCEDURE — 99233 SBSQ HOSP IP/OBS HIGH 50: CPT | Mod: CS

## 2021-02-02 RX ADMIN — ENOXAPARIN SODIUM 40 MILLIGRAM(S): 100 INJECTION SUBCUTANEOUS at 13:25

## 2021-02-02 RX ADMIN — Medication 4 MILLIGRAM(S): at 21:47

## 2021-02-02 RX ADMIN — Medication 100 MILLIGRAM(S): at 21:47

## 2021-02-02 RX ADMIN — AMLODIPINE BESYLATE 2.5 MILLIGRAM(S): 2.5 TABLET ORAL at 05:31

## 2021-02-02 RX ADMIN — Medication 81 MILLIGRAM(S): at 13:25

## 2021-02-02 RX ADMIN — ALBUTEROL 2 PUFF(S): 90 AEROSOL, METERED ORAL at 12:32

## 2021-02-02 RX ADMIN — Medication 100 MILLIGRAM(S): at 05:31

## 2021-02-02 RX ADMIN — Medication 100 MILLIGRAM(S): at 13:25

## 2021-02-02 RX ADMIN — Medication 1200 MILLIGRAM(S): at 18:56

## 2021-02-02 RX ADMIN — ALBUTEROL 2 PUFF(S): 90 AEROSOL, METERED ORAL at 00:45

## 2021-02-02 RX ADMIN — PIPERACILLIN AND TAZOBACTAM 25 GRAM(S): 4; .5 INJECTION, POWDER, LYOPHILIZED, FOR SOLUTION INTRAVENOUS at 18:56

## 2021-02-02 RX ADMIN — Medication 1200 MILLIGRAM(S): at 05:31

## 2021-02-02 RX ADMIN — ALBUTEROL 2 PUFF(S): 90 AEROSOL, METERED ORAL at 18:56

## 2021-02-02 RX ADMIN — ALBUTEROL 2 PUFF(S): 90 AEROSOL, METERED ORAL at 05:32

## 2021-02-02 RX ADMIN — SENNA PLUS 2 TABLET(S): 8.6 TABLET ORAL at 21:47

## 2021-02-02 RX ADMIN — PIPERACILLIN AND TAZOBACTAM 25 GRAM(S): 4; .5 INJECTION, POWDER, LYOPHILIZED, FOR SOLUTION INTRAVENOUS at 10:52

## 2021-02-02 RX ADMIN — PIPERACILLIN AND TAZOBACTAM 25 GRAM(S): 4; .5 INJECTION, POWDER, LYOPHILIZED, FOR SOLUTION INTRAVENOUS at 02:08

## 2021-02-02 NOTE — DISCHARGE NOTE PROVIDER - HOSPITAL COURSE
75M with PMH of HTN, recent dx of COVID s/p monoclonal ab infusion who presents with worsening dyspnea, found to have possible superimposed bacterial PNA. Seen and evaluated by Pulm and ID. s/p Vanco and completed Zosyn for PNA. Patient s/p Monoclonal ab as well as 7 day course of decadron as outpatient. Received additional decadron dose in ED as well. Remdesivir held given RAZ and that pt was dx>10 days ago. RAZ noted, but patient has baseline RAZ 1.8 as per patient's PCP. Patient remains stable with close follow up with PCP as per Dr. Carreon 75M with PMH of HTN, recent dx of COVID s/p monoclonal ab infusion who presents with worsening dyspnea, found to have possible superimposed bacterial PNA. Seen and evaluated by Pulm and ID. s/p Vanco and completed Zosyn for PNA. Patient s/p Monoclonal ab as well as 7 day course of decadron as outpatient. Received additional decadron dose in ED as well. Remdesivir held given RAZ and that pt was dx>10 days ago. RAZ noted, but patient has baseline RAZ 1.8 as per patient's PCP, RAZ resolved. Patient desat on RA with ambulation, home O2 setup for DC. Patient remains stable with close follow up with PCP as per Dr. Carreon 75M with PMH of HTN, recent dx of COVID s/p monoclonal ab infusion who presents with worsening dyspnea, found to have possible superimposed bacterial PNA. Seen and evaluated by Pulm and ID. s/p Vanco and completed Zosyn for PNA. Patient s/p Monoclonal ab as well as 7 day course of decadron as outpatient. Received additional decadron dose in ED as well. Remdesivir held given RAZ and that pt was dx>10 days ago. RAZ noted, but patient has baseline RAZ 1.8 as per patient's PCP, RAZ resolved. Patient desat on RA with ambulation, home O2 setup for DC. Patient remains stable with close follow up with PCP as per Dr. Carreon    Discharge Diagnoses:  # Pneumonia due to COVID-19 virus with superimposed Bacterial pneumonia  # acute respiratory failure with hypoxia  # Acute kidney injury - likely ATN due to dehydration

## 2021-02-02 NOTE — DISCHARGE NOTE PROVIDER - NSDCFUADDAPPT_GEN_ALL_CORE_FT
Please follow up with your PCP in a week for further management. Please call the office before visiting.

## 2021-02-02 NOTE — DISCHARGE NOTE PROVIDER - CARE PROVIDER_API CALL
Dorothy Agarwal)  Cardiovascular Disease; Internal Medicine; Nuclear Cardiology  [address] [place] [address]  Phone: [phone]  Fax: [phone]  Follow Up Time: 1 week

## 2021-02-02 NOTE — PROGRESS NOTE ADULT - ASSESSMENT
75M with PMH of HTN, recent dx of COVID s/p monoclonal ab infusion who presents with worsening dyspnea, found to have possible superimposed bacterial PNA.     Problem/Plan - 1:  ·  Problem: Bacterial pneumonia.  Plan: Acute hypoxic respiratory failure, in setting of COVID 19, likely superimposed infection   - on zosyn day 6/7  - Tessalon perles TID, Mucinex BID ordered. Incentive spirometry, OOB, albuterol MDI  - Per pulm discontinued prednisone.   - Trend inflammatory markers Q72hrs  - Continue supplemental O2 as needed. Check O2 sats on ambulation on room air. Checking O2 requirements for home discharge tomorrow.   - Encouraged use of incentive spirometer  - Plan of care discussed with PMD . Pulm eval by Dr. Joseph Mejia appreciated.      Problem/Plan - 2:  ·  Problem: Pneumonia due to COVID-19 virus.  Plan: s/p Monoclonal ab as well as 7 day course of decadron as outpatient. Received additional decadron dose in ED as well.   - Remdesivir held given RAZ and that pt was dx>10 days ago.  - Trend inflammatory markers     Problem/Plan - 3:  ·  Problem: Acute kidney injury.  Plan: Cr stable, improved to 1.3, baseline 1.8 per pt's PCP  - ACE-I held.- Continue to monitor Cr  - Monitor UOP.      Problem/Plan - 4:  ·  Problem: Essential hypertension.  Plan: c/w Norvasc  BP at goal     Problem/Plan - 5:  ·  Problem: Need for prophylactic measure.  Plan: Lovenox for DVT ppx.   Diet: added glucerna TID to meals due to poor appetite  Bowel regimen: on miralax PRN  Activity: OOB to chair - ambulating independently in the room

## 2021-02-02 NOTE — DISCHARGE NOTE PROVIDER - NSDCMRMEDTOKEN_GEN_ALL_CORE_FT
aspirin 81 mg oral delayed release tablet: 1 tab(s) orally once a day  atenolol 25 mg oral tablet: 0.5 tab(s) orally once a day, As Needed  Norvasc 2.5 mg oral tablet: 1 tab(s) orally once a day  terazosin 5 mg oral capsule: 1 cap(s) orally once a day (at bedtime)   albuterol 90 mcg/inh inhalation aerosol: 2 puff(s) inhaled every 6 hours  aspirin 81 mg oral delayed release tablet: 1 tab(s) orally once a day  atenolol 25 mg oral tablet: 0.5 tab(s) orally once a day, As Needed  Eliquis 2.5 mg oral tablet: 1 tab(s) orally 2 times a day   guaiFENesin 1200 mg oral tablet, extended release: 1 tab(s) orally every 12 hours  Norvasc 2.5 mg oral tablet: 1 tab(s) orally once a day  terazosin 5 mg oral capsule: 1 cap(s) orally once a day (at bedtime)

## 2021-02-02 NOTE — PROGRESS NOTE ADULT - ASSESSMENT
74 y/o [place] speaking M with PMH NILA (uses oral appliance), essential HTN, presumed BPH, hyperlipidemia not currently on Rx. Information obtained from son Jamey via phone as per pts request. Presents to ED with hypoxia, dyspnea and persistent fevers. He reportedly was told on 1/15/21 that he was exposed to COVID, tested positive on 1/18/21. Was sent for monoclonal antibodies and received Regeneron on 1/19 and started Decadron 6mg PO qd. CTA chest neg PE, +subtle GGO and RLL opacity concerning for superimposed bacterial PNA.

## 2021-02-02 NOTE — DISCHARGE NOTE PROVIDER - NSDCCPCAREPLAN_GEN_ALL_CORE_FT
PRINCIPAL DISCHARGE DIAGNOSIS  Diagnosis: COVID-19  Assessment and Plan of Treatment: You tested positive for COVID 19.  You no longer require hospitalization. You should quarantine for 10 days.  Please restrict activities outside of your home except for getting medical care.  Do not go to work, school, or public areas.  Avoid using public transportation, ride-sharing, or taxis.  Separate yourself from other people and animals in your home.  Call ahead before visiting your doctor.  Wear a facemask when you are around other people. Cover your cough and sneezes.  Clean your hands often.  Avoid sharing personal household items.  Clean all frequently touched surfaces daily.        SECONDARY DISCHARGE DIAGNOSES  Diagnosis: Essential hypertension  Assessment and Plan of Treatment: Hypertension, also known simply as "high blood pressure" is very common, however can lead to many significant complications if left uncontrolled. When the blood pressure is elevated, the force the blood puts on the walls of the arteries is high and can lead to artery damage. Also, when the heart muscle has to pump blood against a high blood pressure, it thickens and enlarges, just like any muscle does when it has to do more work (think of a weight ). When the blood pressure is very high, people may feel a headache or tired. Some people can feel pounding in their head or have blurry vision. Hearing the heart beating in the ear especially at night can be a sign of high blood pressure. Eventually, symptoms of stroke, heart attack, heart failure or irregular heartbeats can occur  - Exercise: Doing cardiovascular exercise such as running, biking or swimming at least 30 minutes per day most days of the week is recommended to help keep blood pressure healthy  - Lose weight: Maintaining a normal BMI (body mass index) is very important in keeping blood pressure readings normal   - Avoid salt: Sodium in the diet increases the blood pressure in many ways. Salt comes in many foods, so just because you don't add salt to your food it does not mean that you are eating a low salt diet. Read labels and keep sodium intake to less than 2000 mg per day   - Avoid alcohol: Even 1 or 2 alcoholic drinks can significantly increase blood pressure   - DASH Diet: The DASH diet has been shown to reduce blood pressure   - Take all medication as prescribed.   - Follow up with your medical doctor for routine blood pressure monitoring at your next visit.   - Notify your doctor if you have any of the following symptoms:    - Dizziness, Lightheadedness, Blurry vision, Headache, Chest pain, Shortness of breath      Diagnosis: Acute kidney injury  Assessment and Plan of Treatment: Avoid taking (NSAIDs) - (ex: Ibuprofen, Advil, Celebrex, Naprosyn)  Avoid taking any nephrotoxic agents (can harm kidneys) - Intravenous contrast for diagnostic testing, combination cold medications.  Have all medications adjusted for your renal function by your Health Care Provider.  Blood pressure control is important.  Take all medication as prescribed.       PRINCIPAL DISCHARGE DIAGNOSIS  Diagnosis: COVID-19  Assessment and Plan of Treatment: You tested positive for COVID 19.  You no longer require hospitalization. You should quarantine for 10 days.  Please restrict activities outside of your home except for getting medical care.  Do not go to work, school, or public areas.  Avoid using public transportation, ride-sharing, or taxis.  Separate yourself from other people and animals in your home.  Call ahead before visiting your doctor.  Wear a facemask when you are around other people. Cover your cough and sneezes.  Clean your hands often.  Avoid sharing personal household items.  Clean all frequently touched surfaces daily.        SECONDARY DISCHARGE DIAGNOSES  Diagnosis: Essential hypertension  Assessment and Plan of Treatment: Hypertension, also known simply as "high blood pressure" is very common, however can lead to many significant complications if left uncontrolled. When the blood pressure is elevated, the force the blood puts on the walls of the arteries is high and can lead to artery damage. Also, when the heart muscle has to pump blood against a high blood pressure, it thickens and enlarges, just like any muscle does when it has to do more work (think of a weight ). When the blood pressure is very high, people may feel a headache or tired. Some people can feel pounding in their head or have blurry vision. Hearing the heart beating in the ear especially at night can be a sign of high blood pressure. Eventually, symptoms of stroke, heart attack, heart failure or irregular heartbeats can occur  - Exercise: Doing cardiovascular exercise such as running, biking or swimming at least 30 minutes per day most days of the week is recommended to help keep blood pressure healthy  - Lose weight: Maintaining a normal BMI (body mass index) is very important in keeping blood pressure readings normal   - Avoid salt: Sodium in the diet increases the blood pressure in many ways. Salt comes in many foods, so just because you don't add salt to your food it does not mean that you are eating a low salt diet. Read labels and keep sodium intake to less than 2000 mg per day   - Avoid alcohol: Even 1 or 2 alcoholic drinks can significantly increase blood pressure   - DASH Diet: The DASH diet has been shown to reduce blood pressure   - Take all medication as prescribed.   - Follow up with your medical doctor for routine blood pressure monitoring at your next visit.   - Notify your doctor if you have any of the following symptoms:    - Dizziness, Lightheadedness, Blurry vision, Headache, Chest pain, Shortness of breath      Diagnosis: Acute kidney injury  Assessment and Plan of Treatment: Resolved. Avoid taking (NSAIDs) - (ex: Ibuprofen, Advil, Celebrex, Naprosyn)  Avoid taking any nephrotoxic agents (can harm kidneys) - Intravenous contrast for diagnostic testing, combination cold medications.  Have all medications adjusted for your renal function by your Health Care Provider. Blood pressure control is important.  Take all medication as prescribed.

## 2021-02-02 NOTE — DISCHARGE NOTE PROVIDER - INSTRUCTIONS
Please continue a healthy and balanced diet that is low in sodium (salt) and cholesterol. We recommend healthy or generous servings of fruits/vegetables (high-fiber containing foods) Please provide 3 cans of Glucerna per day to meet the nutritional need

## 2021-02-02 NOTE — DISCHARGE NOTE PROVIDER - CARE PROVIDERS DIRECT ADDRESSES
,[email or web address]

## 2021-02-03 ENCOUNTER — TRANSCRIPTION ENCOUNTER (OUTPATIENT)
Age: 76
End: 2021-02-03

## 2021-02-03 VITALS
TEMPERATURE: 98 F | RESPIRATION RATE: 18 BRPM | OXYGEN SATURATION: 92 % | HEART RATE: 86 BPM | SYSTOLIC BLOOD PRESSURE: 142 MMHG | DIASTOLIC BLOOD PRESSURE: 67 MMHG

## 2021-02-03 LAB
ANION GAP SERPL CALC-SCNC: 11 MMOL/L — SIGNIFICANT CHANGE UP (ref 5–17)
BUN SERPL-MCNC: 18 MG/DL — SIGNIFICANT CHANGE UP (ref 7–23)
CALCIUM SERPL-MCNC: 8.3 MG/DL — LOW (ref 8.4–10.5)
CHLORIDE SERPL-SCNC: 100 MMOL/L — SIGNIFICANT CHANGE UP (ref 96–108)
CO2 SERPL-SCNC: 25 MMOL/L — SIGNIFICANT CHANGE UP (ref 22–31)
CREAT SERPL-MCNC: 1.25 MG/DL — SIGNIFICANT CHANGE UP (ref 0.5–1.3)
D DIMER BLD IA.RAPID-MCNC: 252 NG/ML DDU — HIGH
FERRITIN SERPL-MCNC: 1001 NG/ML — HIGH (ref 30–400)
GLUCOSE SERPL-MCNC: 139 MG/DL — HIGH (ref 70–99)
HCT VFR BLD CALC: 29.6 % — LOW (ref 39–50)
HGB BLD-MCNC: 9.7 G/DL — LOW (ref 13–17)
MCHC RBC-ENTMCNC: 28.9 PG — SIGNIFICANT CHANGE UP (ref 27–34)
MCHC RBC-ENTMCNC: 32.8 GM/DL — SIGNIFICANT CHANGE UP (ref 32–36)
MCV RBC AUTO: 88.1 FL — SIGNIFICANT CHANGE UP (ref 80–100)
NRBC # BLD: 0 /100 WBCS — SIGNIFICANT CHANGE UP (ref 0–0)
PLATELET # BLD AUTO: 288 K/UL — SIGNIFICANT CHANGE UP (ref 150–400)
POTASSIUM SERPL-MCNC: 4 MMOL/L — SIGNIFICANT CHANGE UP (ref 3.5–5.3)
POTASSIUM SERPL-SCNC: 4 MMOL/L — SIGNIFICANT CHANGE UP (ref 3.5–5.3)
RBC # BLD: 3.36 M/UL — LOW (ref 4.2–5.8)
RBC # FLD: 13.7 % — SIGNIFICANT CHANGE UP (ref 10.3–14.5)
SODIUM SERPL-SCNC: 136 MMOL/L — SIGNIFICANT CHANGE UP (ref 135–145)
WBC # BLD: 4.97 K/UL — SIGNIFICANT CHANGE UP (ref 3.8–10.5)
WBC # FLD AUTO: 4.97 K/UL — SIGNIFICANT CHANGE UP (ref 3.8–10.5)

## 2021-02-03 PROCEDURE — 82435 ASSAY OF BLOOD CHLORIDE: CPT

## 2021-02-03 PROCEDURE — 99239 HOSP IP/OBS DSCHRG MGMT >30: CPT | Mod: CS

## 2021-02-03 PROCEDURE — 85379 FIBRIN DEGRADATION QUANT: CPT

## 2021-02-03 PROCEDURE — 36415 COLL VENOUS BLD VENIPUNCTURE: CPT

## 2021-02-03 PROCEDURE — 87040 BLOOD CULTURE FOR BACTERIA: CPT

## 2021-02-03 PROCEDURE — 82803 BLOOD GASES ANY COMBINATION: CPT

## 2021-02-03 PROCEDURE — 83615 LACTATE (LD) (LDH) ENZYME: CPT

## 2021-02-03 PROCEDURE — 82728 ASSAY OF FERRITIN: CPT

## 2021-02-03 PROCEDURE — 86140 C-REACTIVE PROTEIN: CPT

## 2021-02-03 PROCEDURE — 84484 ASSAY OF TROPONIN QUANT: CPT

## 2021-02-03 PROCEDURE — 85730 THROMBOPLASTIN TIME PARTIAL: CPT

## 2021-02-03 PROCEDURE — 83605 ASSAY OF LACTIC ACID: CPT

## 2021-02-03 PROCEDURE — 93005 ELECTROCARDIOGRAM TRACING: CPT

## 2021-02-03 PROCEDURE — 85014 HEMATOCRIT: CPT

## 2021-02-03 PROCEDURE — 71045 X-RAY EXAM CHEST 1 VIEW: CPT | Mod: 26

## 2021-02-03 PROCEDURE — 82550 ASSAY OF CK (CPK): CPT

## 2021-02-03 PROCEDURE — 71045 X-RAY EXAM CHEST 1 VIEW: CPT

## 2021-02-03 PROCEDURE — 82962 GLUCOSE BLOOD TEST: CPT

## 2021-02-03 PROCEDURE — 87070 CULTURE OTHR SPECIMN AEROBIC: CPT

## 2021-02-03 PROCEDURE — 99285 EMERGENCY DEPT VISIT HI MDM: CPT

## 2021-02-03 PROCEDURE — 85018 HEMOGLOBIN: CPT

## 2021-02-03 PROCEDURE — 85025 COMPLETE CBC W/AUTO DIFF WBC: CPT

## 2021-02-03 PROCEDURE — 94640 AIRWAY INHALATION TREATMENT: CPT

## 2021-02-03 PROCEDURE — 82947 ASSAY GLUCOSE BLOOD QUANT: CPT

## 2021-02-03 PROCEDURE — U0005: CPT

## 2021-02-03 PROCEDURE — 85027 COMPLETE CBC AUTOMATED: CPT

## 2021-02-03 PROCEDURE — 71275 CT ANGIOGRAPHY CHEST: CPT

## 2021-02-03 PROCEDURE — 82330 ASSAY OF CALCIUM: CPT

## 2021-02-03 PROCEDURE — 84145 PROCALCITONIN (PCT): CPT

## 2021-02-03 PROCEDURE — 85610 PROTHROMBIN TIME: CPT

## 2021-02-03 PROCEDURE — U0003: CPT

## 2021-02-03 PROCEDURE — 84295 ASSAY OF SERUM SODIUM: CPT

## 2021-02-03 PROCEDURE — 80048 BASIC METABOLIC PNL TOTAL CA: CPT

## 2021-02-03 PROCEDURE — 84132 ASSAY OF SERUM POTASSIUM: CPT

## 2021-02-03 PROCEDURE — 80202 ASSAY OF VANCOMYCIN: CPT

## 2021-02-03 PROCEDURE — 80053 COMPREHEN METABOLIC PANEL: CPT

## 2021-02-03 RX ORDER — APIXABAN 2.5 MG/1
1 TABLET, FILM COATED ORAL
Qty: 60 | Refills: 0
Start: 2021-02-03 | End: 2021-03-04

## 2021-02-03 RX ORDER — ALBUTEROL 90 UG/1
2 AEROSOL, METERED ORAL
Qty: 1 | Refills: 0
Start: 2021-02-03 | End: 2021-02-09

## 2021-02-03 RX ADMIN — Medication 1200 MILLIGRAM(S): at 16:44

## 2021-02-03 RX ADMIN — ENOXAPARIN SODIUM 40 MILLIGRAM(S): 100 INJECTION SUBCUTANEOUS at 12:31

## 2021-02-03 RX ADMIN — Medication 100 MILLIGRAM(S): at 06:06

## 2021-02-03 RX ADMIN — PIPERACILLIN AND TAZOBACTAM 25 GRAM(S): 4; .5 INJECTION, POWDER, LYOPHILIZED, FOR SOLUTION INTRAVENOUS at 10:11

## 2021-02-03 RX ADMIN — PIPERACILLIN AND TAZOBACTAM 25 GRAM(S): 4; .5 INJECTION, POWDER, LYOPHILIZED, FOR SOLUTION INTRAVENOUS at 03:40

## 2021-02-03 RX ADMIN — Medication 1200 MILLIGRAM(S): at 06:07

## 2021-02-03 RX ADMIN — ALBUTEROL 2 PUFF(S): 90 AEROSOL, METERED ORAL at 00:37

## 2021-02-03 RX ADMIN — AMLODIPINE BESYLATE 2.5 MILLIGRAM(S): 2.5 TABLET ORAL at 06:07

## 2021-02-03 RX ADMIN — Medication 81 MILLIGRAM(S): at 12:31

## 2021-02-03 RX ADMIN — ALBUTEROL 2 PUFF(S): 90 AEROSOL, METERED ORAL at 13:24

## 2021-02-03 RX ADMIN — ALBUTEROL 2 PUFF(S): 90 AEROSOL, METERED ORAL at 05:56

## 2021-02-03 NOTE — PROVIDER CONTACT NOTE (OTHER) - SITUATION
patient noted to have low saturation on room air while ambulating
Patient diagnosed with Covid >10 days ago. Asymptomatic. No further isolation required.
Patient has temp. of 101.8
Patient has temp of 100.8
Patient complaining of abdominal pain, abdomen tender. Patient has not have a bowel movement in 2 day.s

## 2021-02-03 NOTE — PROGRESS NOTE ADULT - REASON FOR ADMISSION
Sent in by office physician for persistent and progressive dyspnoea past few days.

## 2021-02-03 NOTE — PROGRESS NOTE ADULT - PROVIDER SPECIALTY LIST ADULT
Hospitalist
Internal Medicine
Pulmonology
Pulmonology
Internal Medicine

## 2021-02-03 NOTE — PROGRESS NOTE ADULT - PROBLEM SELECTOR PLAN 3
Cr stable from yesterday to today. ACE-I held. Baseline Cr appears to be 1.2-1.4 as per outpatient lab review and outpatient documentation from dr. Villanueva. Cr improving from outpatient value of 1.8  - Continue to monitor Cr  - Monitor UOP
2nd to PNA  -Wean supplemental O2 as tolerated, keep sats >90%.
Cr stable from yesterday to today. ACE-I held. Baseline Cr appears to be 1.2. Cr improving from outpatient value of 1.8  - Continue to monitor Cr  - Monitor UOP
2nd to PNA  -Wean supplemental O2 as tolerated, keep sats >90%.  -D/c planning from pulm perspective with home O2 2LNC continuous. Discussed with son via phone at length.

## 2021-02-03 NOTE — PROVIDER CONTACT NOTE (OTHER) - REASON
Patient has temp. of 101.8
Discontinuation of Isolation for COVID patient
Patient complaining of abdominal pain, abdomen tender.
abulation on room air
Patient has temp of 100.8

## 2021-02-03 NOTE — PROGRESS NOTE ADULT - PROBLEM SELECTOR PROBLEM 1
2019 novel coronavirus disease (COVID-19)
Bacterial pneumonia
Bacterial pneumonia
2019 novel coronavirus disease (COVID-19)

## 2021-02-03 NOTE — PROGRESS NOTE ADULT - SUBJECTIVE AND OBJECTIVE BOX
[facility] Division of Hospital Medicine  Randy Carreon MD, ARNALDO  Pager (DEANNA-MARTIR, 3M-4H): [phone]  Other Times:  [phone]    Patient is a 75y old  Male who presents with a chief complaint of Sent in by office physician for persistent and progressive dyspnoea past few days. (01 Feb 2021 11:15)      SUBJECTIVE / OVERNIGHT EVENTS:  No events overnight. No new complaints.     MEDICATIONS  (STANDING):  ALBUTerol    90 MICROgram(s) HFA Inhaler 2 Puff(s) Inhalation every 6 hours  amLODIPine   Tablet 2.5 milliGRAM(s) Oral daily  aspirin enteric coated 81 milliGRAM(s) Oral daily  benzonatate 100 milliGRAM(s) Oral every 8 hours  doxazosin 4 milliGRAM(s) Oral at bedtime  enoxaparin Injectable 40 milliGRAM(s) SubCutaneous daily  guaiFENesin ER 1200 milliGRAM(s) Oral every 12 hours  piperacillin/tazobactam IVPB.. 3.375 Gram(s) IV Intermittent every 8 hours  senna 2 Tablet(s) Oral at bedtime  sodium chloride 0.9%. 1000 milliLiter(s) (30 mL/Hr) IV Continuous <Continuous>    MEDICATIONS  (PRN):  acetaminophen   Tablet .. 650 milliGRAM(s) Oral every 4 hours PRN Temp greater or equal to 38.5C (101.3F)  polyethylene glycol 3350 17 Gram(s) Oral daily PRN Constipation    CAPILLARY BLOOD GLUCOSE      POCT Blood Glucose.: 178 mg/dL (01 Feb 2021 04:21)    I&O's Summary    31 Jan 2021 07:01  -  01 Feb 2021 07:00  --------------------------------------------------------  IN: 1560 mL / OUT: 3225 mL / NET: -1665 mL    01 Feb 2021 07:01  -  01 Feb 2021 19:16  --------------------------------------------------------  IN: 960 mL / OUT: 400 mL / NET: 560 mL        PHYSICAL EXAM:  Vital Signs Last 24 Hrs  T(C): 36.6 (01 Feb 2021 11:15), Max: 36.9 (31 Jan 2021 21:14)  T(F): 97.8 (01 Feb 2021 11:15), Max: 98.5 (31 Jan 2021 21:14)  HR: 87 (01 Feb 2021 11:15) (87 - 90)  BP: 145/71 (01 Feb 2021 11:15) (126/64 - 157/78)  BP(mean): --  RR: 18 (01 Feb 2021 11:15) (18 - 19)  SpO2: 94% (01 Feb 2021 11:15) (93% - 95%)    CONSTITUTIONAL: NAD  EYES: EOMI, conjunctiva and sclera clear  NECK: Supple, no JVD  RESPIRATORY: bibasilar rales (R>L)  CARDIOVASCULAR: Regular rate and rhythm, normal S1 and S2, no murmur/rub/gallop; No lower extremity edema  ABDOMEN: Nontender to palpation, normoactive bowel sounds, no rebound/guarding; No hepatosplenomegaly  MUSCULOSKELETAL:  Normal gait; no clubbing or cyanosis of digits; no joint swelling or tenderness to palpation  PSYCH: A+O to person, place, and time; affect appropriate  NEUROLOGY: CN 2-12 are intact and symmetric; no gross sensory deficits   SKIN: No rashes; no palpable lesions      LABS:                        11.3   7.58  )-----------( 268      ( 01 Feb 2021 04:46 )             34.8     02-01    135  |  99  |  21  ----------------------------<  187<H>  4.4   |  25  |  1.37<H>    Ca    8.4      01 Feb 2021 04:46    TPro  7.1  /  Alb  3.2<L>  /  TBili  0.3  /  DBili  x   /  AST  26  /  ALT  19  /  AlkPhos  43  02-01        Culture - Sputum (collected 01 Feb 2021 01:13)  Source: .Sputum Sputum  Gram Stain (01 Feb 2021 13:57):    Few polymorphonuclear leukocytes per low power field    Rare Squamous epithelial cells per low power field    Few Gram Variable Rods per oil power field        RADIOLOGY & ADDITIONAL TESTS:    Lab Results Reviewed: no leukocytosis, Cr stable CKD stage 3, LFTs normal    Imaging Personally Reviewed:   CT Chest  
[facility] Division of Hospital Medicine  Camille Frias MD  Pager (M-F, 8F-8B): [phone]  Other Times:  [phone]    Patient is a 75y old  Male who presents with a chief complaint of Sent in by office physician for persistent and progressive dyspnoea past few days. (29 Jan 2021 13:05)      SUBJECTIVE / OVERNIGHT EVENTS: Pt continues to have fevers and chills, 90% O2 sat on RA    ADDITIONAL REVIEW OF SYSTEMS: negative    MEDICATIONS  (STANDING):  amLODIPine   Tablet 2.5 milliGRAM(s) Oral daily  aspirin enteric coated 81 milliGRAM(s) Oral daily  benzonatate 100 milliGRAM(s) Oral every 8 hours  doxazosin 4 milliGRAM(s) Oral at bedtime  enoxaparin Injectable 40 milliGRAM(s) SubCutaneous daily  guaiFENesin ER 1200 milliGRAM(s) Oral every 12 hours  piperacillin/tazobactam IVPB.. 3.375 Gram(s) IV Intermittent every 8 hours    MEDICATIONS  (PRN):  acetaminophen   Tablet .. 650 milliGRAM(s) Oral every 4 hours PRN Temp greater or equal to 38.5C (101.3F)  polyethylene glycol 3350 17 Gram(s) Oral daily PRN Constipation      CAPILLARY BLOOD GLUCOSE        I&O's Summary    29 Jan 2021 07:01  -  30 Jan 2021 07:00  --------------------------------------------------------  IN: 1590 mL / OUT: 1750 mL / NET: -160 mL    30 Jan 2021 07:01  -  30 Jan 2021 17:15  --------------------------------------------------------  IN: 820 mL / OUT: 350 mL / NET: 470 mL        PHYSICAL EXAM:  Vital Signs Last 24 Hrs  T(C): 37.9 (30 Jan 2021 16:27), Max: 38.8 (29 Jan 2021 20:40)  T(F): 100.3 (30 Jan 2021 16:27), Max: 101.9 (29 Jan 2021 21:09)  HR: 93 (30 Jan 2021 10:37) (93 - 111)  BP: 140/71 (30 Jan 2021 10:37) (137/79 - 163/71)  BP(mean): --  RR: 18 (30 Jan 2021 10:37) (18 - 18)  SpO2: 90% (30 Jan 2021 16:27) (90% - 97%)  CONSTITUTIONAL: NAD, well-developed, well-groomed  EYES: PERRLA; conjunctiva and sclera clear  ENMT: Moist oral mucosa, no pharyngeal injection or exudates; normal dentition  NECK: Supple, no palpable masses; no thyromegaly  RESPIRATORY: Normal respiratory effort; lungs are clear to auscultation bilaterally  CARDIOVASCULAR: Regular rate and rhythm, normal S1 and S2, no murmur/rub/gallop; No lower extremity edema; Peripheral pulses are 2+ bilaterally  ABDOMEN: Nontender to palpation, normoactive bowel sounds, no rebound/guarding; No hepatosplenomegaly  MUSCULOSKELETAL:  Normal gait; no clubbing or cyanosis of digits; no joint swelling or tenderness to palpation  PSYCH: A+O to person, place, and time; affect appropriate  NEUROLOGY: CN 2-12 are intact and symmetric; no gross sensory deficits   SKIN: No rashes; no palpable lesions    LABS:                        11.1   9.55  )-----------( 265      ( 29 Jan 2021 06:08 )             34.2     01-30    132<L>  |  95<L>  |  25<H>  ----------------------------<  205<H>  3.9   |  24  |  1.58<H>    Ca    8.3<L>      30 Jan 2021 06:11    TPro  6.3  /  Alb  3.2<L>  /  TBili  0.3  /  DBili  x   /  AST  23  /  ALT  20  /  AlkPhos  47  01-29        Culture - Blood (collected 28 Jan 2021 00:27)  Source: .Blood Blood-Venous  Preliminary Report (29 Jan 2021 01:03):    No growth to date.    Culture - Blood (collected 28 Jan 2021 00:27)  Source: .Blood Blood-Venous  Preliminary Report (29 Jan 2021 01:03):    No growth to date.      
[facility] Division of Hospital Medicine  Camille Frias MD  Pager (M-F, 8A-5P): [phone]  Other Times:  [phone]    Patient is a 75y old  Male who presents with a chief complaint of Sent in by office physician for persistent and progressive dyspnea past few days. No fever recorded in past 24 hrs since vancomycin was added to antibiotic regimen    SUBJECTIVE / OVERNIGHT EVENTS: No acute events. Pt remains hypoxic, O2 sats 92% on 2L O2.     ADDITIONAL REVIEW OF SYSTEMS: +productive cough, +SOB, +constipation; otherwise negative    MEDICATIONS  (STANDING):  ALBUTerol    90 MICROgram(s) HFA Inhaler 2 Puff(s) Inhalation every 6 hours  amLODIPine   Tablet 2.5 milliGRAM(s) Oral daily  aspirin enteric coated 81 milliGRAM(s) Oral daily  benzonatate 100 milliGRAM(s) Oral every 8 hours  doxazosin 4 milliGRAM(s) Oral at bedtime  enoxaparin Injectable 40 milliGRAM(s) SubCutaneous daily  guaiFENesin ER 1200 milliGRAM(s) Oral every 12 hours  piperacillin/tazobactam IVPB.. 3.375 Gram(s) IV Intermittent every 8 hours  predniSONE   Tablet 40 milliGRAM(s) Oral daily  senna 2 Tablet(s) Oral at bedtime  vancomycin  IVPB      vancomycin  IVPB 1250 milliGRAM(s) IV Intermittent every 12 hours    MEDICATIONS  (PRN):  acetaminophen   Tablet .. 650 milliGRAM(s) Oral every 4 hours PRN Temp greater or equal to 38.5C (101.3F)  polyethylene glycol 3350 17 Gram(s) Oral daily PRN Constipation      PHYSICAL EXAM:  T(C): 36.9 (01-31-21 @ 10:24), Max: 36.9 (01-30-21 @ 20:34)  T(F): 98.4 (01-31-21 @ 10:24), Max: 98.4 (01-30-21 @ 20:34)  HR: 101 (01-31-21 @ 10:24) (90 - 101)  BP: 165/76 (01-31-21 @ 10:24) (124/65 - 165/76)  RR: 18 (01-31-21 @ 10:24) (18 - 18)  SpO2: 92% (01-31-21 @ 10:24) (88% - 94%)  Wt(kg): --    CONSTITUTIONAL: NAD, slightly toxic appearing elderly male  EYES: PERRLA; conjunctiva and sclera clear  ENMT: Moist oral mucosa, no pharyngeal injection or exudates; normal dentition  NECK: Supple, no palpable masses; no thyromegaly  RESPIRATORY: Mild basilar wheeze which improves with cough  CARDIOVASCULAR: Regular rate and rhythm, normal S1 and S2, no murmur/rub/gallop; No lower extremity edema; Peripheral pulses are 2+ bilaterally  ABDOMEN: Nontender to palpation, normoactive bowel sounds, no rebound/guarding; No hepatosplenomegaly  MUSCULOSKELETAL:  Normal gait; no clubbing or cyanosis of digits; no joint swelling or tenderness to palpation  PSYCH: A+O to person, place, and time; affect appropriate  NEUROLOGY: CN 2-12 are intact and symmetric; no gross sensory deficits   SKIN: No rashes; no palpable lesions    LABS:  LABS/RADIOLOGY RESULTS:                          10.5   9.17  )-----------( 249      ( 31 Jan 2021 06:19 )             31.3   01-31    132<L>  |  97  |  20  ----------------------------<  86  4.0   |  22  |  1.33<H>    Ca    8.1<L>      31 Jan 2021 06:16    Blood Cultures    
[facility] Division of Hospital Medicine  Randy Carreon MD, ARNALDO  Pager (DEANNA-MARTIR, 2U-8O): [phone]  Other Times:  [phone]    Patient is a 75y old  Male who presents with a chief complaint of Sent in by office physician for persistent and progressive dyspnoea past few days. (02 Feb 2021 17:09)      SUBJECTIVE / OVERNIGHT EVENTS:  No events overnight. No new complaints. Patient states that he is feeling better.     MEDICATIONS  (STANDING):  ALBUTerol    90 MICROgram(s) HFA Inhaler 2 Puff(s) Inhalation every 6 hours  amLODIPine   Tablet 2.5 milliGRAM(s) Oral daily  aspirin enteric coated 81 milliGRAM(s) Oral daily  benzonatate 100 milliGRAM(s) Oral every 8 hours  doxazosin 4 milliGRAM(s) Oral at bedtime  enoxaparin Injectable 40 milliGRAM(s) SubCutaneous daily  guaiFENesin ER 1200 milliGRAM(s) Oral every 12 hours  piperacillin/tazobactam IVPB.. 3.375 Gram(s) IV Intermittent every 8 hours  senna 2 Tablet(s) Oral at bedtime  sodium chloride 0.9%. 1000 milliLiter(s) (30 mL/Hr) IV Continuous <Continuous>    MEDICATIONS  (PRN):  acetaminophen   Tablet .. 650 milliGRAM(s) Oral every 4 hours PRN Temp greater or equal to 38.5C (101.3F)  polyethylene glycol 3350 17 Gram(s) Oral daily PRN Constipation      I&O's Summary    01 Feb 2021 07:01  -  02 Feb 2021 07:00  --------------------------------------------------------  IN: 1860 mL / OUT: 1400 mL / NET: 460 mL    02 Feb 2021 07:01  -  02 Feb 2021 20:00  --------------------------------------------------------  IN: 1200 mL / OUT: 800 mL / NET: 400 mL        PHYSICAL EXAM:  Vital Signs Last 24 Hrs  T(C): 36.7 (02 Feb 2021 10:49), Max: 37 (02 Feb 2021 04:36)  T(F): 98.1 (02 Feb 2021 10:49), Max: 98.6 (02 Feb 2021 04:36)  HR: 100 (02 Feb 2021 10:49) (78 - 100)  BP: 142/66 (02 Feb 2021 10:49) (134/55 - 142/66)  BP(mean): --  RR: 18 (02 Feb 2021 18:57) (18 - 18)  SpO2: 95% (02 Feb 2021 19:30) (93% - 95%)    CONSTITUTIONAL: NAD  EYES: EOMI, conjunctiva and sclera clear  NECK: Supple, no JVD  RESPIRATORY: bibasilar rales (R>L)  CARDIOVASCULAR: Regular rate and rhythm, normal S1 and S2, no murmur/rub/gallop; No lower extremity edema  ABDOMEN: Nontender to palpation, normoactive bowel sounds, no rebound/guarding; No hepatosplenomegaly  MUSCULOSKELETAL:  Normal gait; no clubbing or cyanosis of digits; no joint swelling or tenderness to palpation  PSYCH: A+O to person, place, and time; affect appropriate  NEUROLOGY: CN 2-12 are intact and symmetric; no gross sensory deficits   SKIN: No rashes; no palpable lesions      LABS:                        10.0   6.39  )-----------( 261      ( 02 Feb 2021 04:39 )             29.9     02-02    136  |  101  |  23  ----------------------------<  90  3.9   |  26  |  1.44<H>    Ca    8.5      02 Feb 2021 04:39    TPro  6.1  /  Alb  3.0<L>  /  TBili  0.2  /  DBili  x   /  AST  26  /  ALT  18  /  AlkPhos  42  02-02              Culture - Sputum (collected 01 Feb 2021 01:13)  Source: .Sputum Sputum  Gram Stain (01 Feb 2021 13:57):    Few polymorphonuclear leukocytes per low power field    Rare Squamous epithelial cells per low power field    Few Gram Variable Rods per oil power field  Final Report (02 Feb 2021 17:30):    Normal Respiratory Mitra present        RADIOLOGY & ADDITIONAL TESTS:    Lab Results Reviewed: ferritin still uptrending, otherwise labs are okay      COORDINATION OF CARE:  Care Discussed with Consultants/Other Providers:  Dr. Paz (PMD) updated plan of care
Follow-up Pulm Progress Note    Feels tired today, poor appetite   No c/o SOB at this time  Sats 95% 2LNC    Medications:  MEDICATIONS  (STANDING):  ALBUTerol    90 MICROgram(s) HFA Inhaler 2 Puff(s) Inhalation every 6 hours  amLODIPine   Tablet 2.5 milliGRAM(s) Oral daily  aspirin enteric coated 81 milliGRAM(s) Oral daily  benzonatate 100 milliGRAM(s) Oral every 8 hours  doxazosin 4 milliGRAM(s) Oral at bedtime  enoxaparin Injectable 40 milliGRAM(s) SubCutaneous daily  guaiFENesin ER 1200 milliGRAM(s) Oral every 12 hours  piperacillin/tazobactam IVPB.. 3.375 Gram(s) IV Intermittent every 8 hours  senna 2 Tablet(s) Oral at bedtime  sodium chloride 0.9%. 1000 milliLiter(s) (30 mL/Hr) IV Continuous <Continuous>    MEDICATIONS  (PRN):  acetaminophen   Tablet .. 650 milliGRAM(s) Oral every 4 hours PRN Temp greater or equal to 38.5C (101.3F)  polyethylene glycol 3350 17 Gram(s) Oral daily PRN Constipation          Vital Signs Last 24 Hrs  T(C): 36.7 (02 Feb 2021 10:49), Max: 37 (02 Feb 2021 04:36)  T(F): 98.1 (02 Feb 2021 10:49), Max: 98.6 (02 Feb 2021 04:36)  HR: 100 (02 Feb 2021 10:49) (78 - 100)  BP: 142/66 (02 Feb 2021 10:49) (134/55 - 142/66)  BP(mean): --  RR: 18 (02 Feb 2021 10:49) (18 - 18)  SpO2: 93% (02 Feb 2021 10:49) (93% - 94%)          02-01 @ 07:01  -  02-02 @ 07:00  --------------------------------------------------------  IN: 1860 mL / OUT: 1400 mL / NET: 460 mL          LABS:                        10.0   6.39  )-----------( 261      ( 02 Feb 2021 04:39 )             29.9     02-02    136  |  101  |  23  ----------------------------<  90  3.9   |  26  |  1.44<H>    Ca    8.5      02 Feb 2021 04:39    TPro  6.1  /  Alb  3.0<L>  /  TBili  0.2  /  DBili  x   /  AST  26  /  ALT  18  /  AlkPhos  42  02-02          CAPILLARY BLOOD GLUCOSE      POCT Blood Glucose.: 178 mg/dL (01 Feb 2021 04:21)      CULTURES:     Culture - Blood (collected 01-28-21 @ 00:27)  Source: .Blood Blood-Venous  Final Report (02-02-21 @ 01:01):    No Growth Final    Culture - Blood (collected 01-28-21 @ 00:27)  Source: .Blood Blood-Venous  Final Report (02-02-21 @ 01:01):    No Growth Final          Physical Examination:  PULM: decreased BS RLL  CVS: S1, S2 heard    RADIOLOGY REVIEWED  CT chest: < from: CT Angio Chest w/ IV Cont (01.27.21 @ 17:24) >  FINDINGS:    PULMONARY VESSELS: No pulmonary embolus within the main pulmonary arteries. The lobar, segmental and subsegmental branches are not evaluated secondary to motion and streak artifact caused by patient's arms. Main pulmonary artery normal in diameter.    HEART AND VASCULATURE: Heart size is normal. No pericardial effusion. No aortic aneurysm or dissection.    LUNGS, AIRWAYS, PLEURA: Patent central airways. Minimal peripheral opacities within right middle lobe. Heterogeneous consolidation of right lower lobe can represent superimposed bacterial infection or pulmonary infarct. No pleural effusions or pneumothorax.    MEDIASTINUM: Mildly enlarged right hilar lymph node measuring 1.5 cm in short axis.    UPPER ABDOMEN: The righthemidiaphragm is elevated.    BONES AND SOFT TISSUES: No aggressive osseous lesion.    LOWER NECK: Within normal limits.    IMPRESSION:    Significantly limited exam.  The lobar, segmental and subsegmental branches of the pulmonary arteries are not evaluated secondary to motion and streak artifact caused by patient's arms position.    Minimal peripheral opacities within right middle lobe can represent COVID19 infection.    Heterogeneous consolidation of right lower lobe can represent superimposedbacterial pneumonia or pulmonary infarct. Recommend repeat of the exam to rule out pulmonary embolus in the smaller pulmonary arteries.      < end of copied text >    
Follow-up Pulm Progress Note    Seen earlier    Feeling better today  Mild cough  Sats 95% 2LNC    Medications:  MEDICATIONS  (STANDING):  ALBUTerol    90 MICROgram(s) HFA Inhaler 2 Puff(s) Inhalation every 6 hours  amLODIPine   Tablet 2.5 milliGRAM(s) Oral daily  aspirin enteric coated 81 milliGRAM(s) Oral daily  doxazosin 4 milliGRAM(s) Oral at bedtime  enoxaparin Injectable 40 milliGRAM(s) SubCutaneous daily  guaiFENesin ER 1200 milliGRAM(s) Oral every 12 hours  piperacillin/tazobactam IVPB.. 3.375 Gram(s) IV Intermittent every 8 hours  senna 2 Tablet(s) Oral at bedtime    MEDICATIONS  (PRN):  acetaminophen   Tablet .. 650 milliGRAM(s) Oral every 4 hours PRN Temp greater or equal to 38.5C (101.3F)  polyethylene glycol 3350 17 Gram(s) Oral daily PRN Constipation          Vital Signs Last 24 Hrs  T(C): 36.8 (03 Feb 2021 10:38), Max: 37 (02 Feb 2021 21:03)  T(F): 98.3 (03 Feb 2021 10:38), Max: 98.6 (02 Feb 2021 21:03)  HR: 86 (03 Feb 2021 10:38) (74 - 86)  BP: 142/67 (03 Feb 2021 10:38) (137/71 - 144/69)  BP(mean): --  RR: 18 (03 Feb 2021 10:38) (18 - 24)  SpO2: 92% (03 Feb 2021 10:38) (87% - 95%)          02-02 @ 07:01  -  02-03 @ 07:00  --------------------------------------------------------  IN: 1200 mL / OUT: 2300 mL / NET: -1100 mL          LABS:                        9.7    4.97  )-----------( 288      ( 03 Feb 2021 06:07 )             29.6     02-03    136  |  100  |  18  ----------------------------<  139<H>  4.0   |  25  |  1.25    Ca    8.3<L>      03 Feb 2021 06:05    TPro  6.1  /  Alb  3.0<L>  /  TBili  0.2  /  DBili  x   /  AST  26  /  ALT  18  /  AlkPhos  42  02-02          CULTURES:     Culture - Blood (collected 01-28-21 @ 00:27)  Source: .Blood Blood-Venous  Final Report (02-02-21 @ 01:01):    No Growth Final    Culture - Blood (collected 01-28-21 @ 00:27)  Source: .Blood Blood-Venous  Final Report (02-02-21 @ 01:01):    No Growth Final          Physical Examination:  GEN: NAD  NEURO:: A&O x4, moves all extremities   PULM: decreased BS  CVS: S1, S2 heard  EXT: no edema    RADIOLOGY REVIEWED  CXR 2/3: grossly unchanged RLL opacity 
El Hernandez MD  Division of Hospital Medicine  Pager [phone]  If no response or off hours page: [phone]  ---------------------------------------------------------    CHECO QUINONES  75y  Male      Patient is a 75y old  Male who presents with a chief complaint of Sent in by office physician for persistent and progressive dyspnoea past few days. (27 Jan 2021 21:04)      INTERVAL HPI/OVERNIGHT EVENTS:  Seen at bedside. Feels better today. Less SOB. No chest pain, fever/chills, palpitations      REVIEW OF SYSTEMS: 10 point ROS negative unless listed above    T(C): 36.4 (01-28-21 @ 11:02), Max: 37.1 (01-27-21 @ 13:05)  HR: 70 (01-28-21 @ 11:02) (70 - 88)  BP: 135/71 (01-28-21 @ 11:02) (135/71 - 164/80)  RR: 18 (01-28-21 @ 11:02) (18 - 20)  SpO2: 94% (01-28-21 @ 11:02) (93% - 98%)  Wt(kg): --Vital Signs Last 24 Hrs  T(C): 36.4 (28 Jan 2021 11:02), Max: 37.1 (27 Jan 2021 13:05)  T(F): 97.6 (28 Jan 2021 11:02), Max: 98.7 (27 Jan 2021 13:05)  HR: 70 (28 Jan 2021 11:02) (70 - 88)  BP: 135/71 (28 Jan 2021 11:02) (135/71 - 164/80)  BP(mean): --  RR: 18 (28 Jan 2021 11:02) (18 - 20)  SpO2: 94% (28 Jan 2021 11:02) (93% - 98%)    PHYSICAL EXAM:  GENERAL: NAD, well-groomed, well-developed  HEAD:  Atraumatic, Normocephalic  EYES: EOMI, PERRLA, conjunctiva and sclera clear  ENMT: No tonsillar erythema, exudates; Moist mucous membranes. No lesions  NECK: Supple, No JVD  CHEST/LUNG: Decreased breath sounds at the bases R>L  HEART: Regular rate and rhythm; No murmurs, rubs, or gallops  ABDOMEN: Soft, Nontender, Nondistended; Bowel sounds present.    EXTREMITIES:  2+ Peripheral Pulses, No clubbing, cyanosis, or edema  PSYCH: Alert & Oriented x3    Consultant(s) Notes Reviewed:  [x ] YES  [ ] NO  Care Discussed with Consultants/Other Providers [ x] YES  [ ] NO    LABS:                        11.0   7.57  )-----------( 240      ( 28 Jan 2021 06:31 )             34.1     01-28    136  |  100  |  26<H>  ----------------------------<  154<H>  4.4   |  22  |  1.45<H>    Ca    8.6      28 Jan 2021 06:29    TPro  6.2  /  Alb  3.3  /  TBili  0.4  /  DBili  x   /  AST  20  /  ALT  20  /  AlkPhos  46  01-28    PT/INR - ( 28 Jan 2021 02:05 )   PT: 14.1 sec;   INR: 1.18 ratio         PTT - ( 28 Jan 2021 02:05 )  PTT:30.7 sec    CAPILLARY BLOOD GLUCOSE                RADIOLOGY & ADDITIONAL TESTS:    Imaging Personally Reviewed:  [x ] YES  [ ] NO
El Hernandez MD  Division of Hospital Medicine  Pager [phone]  If no response or off hours page: [phone]  ---------------------------------------------------------    CHECO QUINONES  75y  Male      Patient is a 75y old  Male who presents with a chief complaint of Sent in by office physician for persistent and progressive dyspnoea past few days. (28 Jan 2021 12:29)      INTERVAL HPI/OVERNIGHT EVENTS:  Seen at bedside. Had episode of chills overnight. Coughing. Did not sleep well      REVIEW OF SYSTEMS: 10 point ROS negative unless listed above    T(C): 36.8 (01-29-21 @ 10:40), Max: 36.9 (01-29-21 @ 05:10)  HR: 99 (01-29-21 @ 10:40) (91 - 99)  BP: 147/70 (01-29-21 @ 10:40) (143/74 - 153/74)  RR: 18 (01-29-21 @ 10:40) (18 - 18)  SpO2: 94% (01-29-21 @ 10:40) (90% - 94%)  Wt(kg): --Vital Signs Last 24 Hrs  T(C): 36.8 (29 Jan 2021 10:40), Max: 36.9 (29 Jan 2021 05:10)  T(F): 98.3 (29 Jan 2021 10:40), Max: 98.5 (29 Jan 2021 05:10)  HR: 99 (29 Jan 2021 10:40) (91 - 99)  BP: 147/70 (29 Jan 2021 10:40) (143/74 - 153/74)  BP(mean): --  RR: 18 (29 Jan 2021 10:40) (18 - 18)  SpO2: 94% (29 Jan 2021 10:40) (90% - 94%)    PHYSICAL EXAM:  GENERAL: NAD, well-groomed, well-developed  HEAD:  Atraumatic, Normocephalic  EYES: EOMI, PERRLA, conjunctiva and sclera clear  ENMT: No tonsillar erythema, exudates; Moist mucous membranes. No lesions  NECK: Supple, No JVD  CHEST/LUNG: Decreased breath sounds at the bases R>L  HEART: Regular rate and rhythm; No murmurs, rubs, or gallops  ABDOMEN: Soft, Nontender, Nondistended; Bowel sounds present.    EXTREMITIES:  2+ Peripheral Pulses, No clubbing, cyanosis, or edema  PSYCH: Alert & Oriented x3    Consultant(s) Notes Reviewed:  [x ] YES  [ ] NO  Care Discussed with Consultants/Other Providers [ x] YES  [ ] NO    LABS:                        11.1   9.55  )-----------( 265      ( 29 Jan 2021 06:08 )             34.2     01-29    136  |  102  |  26<H>  ----------------------------<  88  4.3   |  23  |  1.43<H>    Ca    8.5      29 Jan 2021 06:08    TPro  6.3  /  Alb  3.2<L>  /  TBili  0.3  /  DBili  x   /  AST  23  /  ALT  20  /  AlkPhos  47  01-29    PT/INR - ( 28 Jan 2021 02:05 )   PT: 14.1 sec;   INR: 1.18 ratio         PTT - ( 28 Jan 2021 02:05 )  PTT:30.7 sec    CAPILLARY BLOOD GLUCOSE                RADIOLOGY & ADDITIONAL TESTS:    Imaging Personally Reviewed:  [x ] YES  [ ] NO

## 2021-02-03 NOTE — PROGRESS NOTE ADULT - PROBLEM SELECTOR PROBLEM 2
Pneumonia due to COVID-19 virus
Pneumonia due to COVID-19 virus
R/O Bacterial pneumonia
R/O Bacterial pneumonia

## 2021-02-03 NOTE — PROGRESS NOTE ADULT - PROBLEM SELECTOR PLAN 2
possible RLL PNA  -C/w Zosyn  -Incentive spirometer use encouraged  -CXR 2/3 with grossly unchanged RLL opacity. Can repeat CXR in few weeks.
possible RLL PNA  -C/w Agustinn
s/p Monoclonal ab as well as 7 day course of decadron as outpatient. Received additional decadron dose in ED as well.   - Will hold Remdesivir given RAZ and that pt was dx>10 days ago. Will hold further steroid dosing given concern for possible bacterial PNA  - Trend inflammatory markers  - Titrate off O2 as tolerated
s/p Monoclonal ab as well as 7 day course of decadron as outpatient. Received additional decadron dose yesterday as well.   - Will hold Remdesivir given RAZ. Will hold further steroid dosing given concern for possible bacterial PNA  - Trend inflammatory markers  - Titrate off O2 as tolerated

## 2021-02-03 NOTE — PROGRESS NOTE ADULT - PROBLEM SELECTOR PLAN 4
by hx  -Uses oral appliance at home.
by hx  -Uses oral appliance at home.
[facility]

## 2021-02-03 NOTE — PROGRESS NOTE ADULT - PROBLEM SELECTOR PLAN 1
Pt presenting with worsening dyspnea for past several days. Was recently dx with COVID, s/p Monoclonal ab as well as decadron. CT chest reviewed is a limited study, but there is a heterogeneous consolidation of right lower lobe concerning for possible superimposed bacterial pna. Started on Zosyn. Pt clinically reports feeling better  - will c/w abx for now. Plan to treat for 5 day course.  - Tessalon perles TIS, mucinex BID ordered. Incentive spirometry, OOB  - Wean O2 as tolerated
Pt presenting with worsening dyspnea for past several days. Was recently dx with COVID, s/p Monoclonal ab as well as decadron. CT chest reviewed is a limited study, but there is a heterogeneous consolidation of right lower lobe concerning for possible superimposed bacterial pna. Started on Zosyn. Pt clinically reports feeling better today  - will c/w abx for now. Plan to treat for 5 day course.  - Wean O2 as tolerated
+COVID PCR  -S/p Regeneron 1/19  -Already completed 10 days decadron. Prednisone d/c'd, no clear benefit in continuing steroids past 10 days in the absence of underlying lung disease  -Remdesivir unlikely to have any benefit at this point  -Trend inflammatory markers.  -DVT ppx  -C/w tessalon perle 100mg PO TID x another 5 days
+COVID PCR  -S/p Regeneron 1/19  -Already completed 10 days decadron. Can d/c prednisone, no clear benefit in continuing steroids past 10 days in the absence of underlying lung disease  -Remdesivir unlikely to have any benefit at this point  -Trend inflammatory markers.  -DVT ppx

## 2021-02-03 NOTE — PROVIDER CONTACT NOTE (OTHER) - ACTION/TREATMENT ORDERED:
NP aware, oxygen replaced and will monitor
DES Navarrete ordered IV Tylenol
DES Navarrete ordered to give PO Tylenol
BINA ng aware. Will continue to monitor.

## 2021-02-03 NOTE — PROGRESS NOTE ADULT - PROBLEM SELECTOR PROBLEM 4
NILA (obstructive sleep apnea)
NILA (obstructive sleep apnea)
Essential hypertension
Essential hypertension

## 2021-02-03 NOTE — PROGRESS NOTE ADULT - ASSESSMENT
74 y/o Finnish speaking M with PMH NILA (uses oral appliance), essential HTN, presumed BPH, hyperlipidemia not currently on Rx. Information obtained from son Jamey via phone as per pts request. Presents to ED with hypoxia, dyspnea and persistent fevers. He reportedly was told on 1/15/21 that he was exposed to COVID, tested positive on 1/18/21. Was sent for monoclonal antibodies and received Regeneron on 1/19 and started Decadron 6mg PO qd. CTA chest neg PE, +subtle GGO and RLL opacity concerning for superimposed bacterial PNA.

## 2021-02-03 NOTE — PROVIDER CONTACT NOTE (OTHER) - ASSESSMENT
Patient is warm to touch. Patient denies chest pain, SOB, chills and lightheadedness. Patient's vitals are stable.
A/Ox4, patient complains of abdomen discomfort.
Patient is complaining of chills and has a temp. Vitals are stable
alert, ALLAN and coughing while ambulating. saturation 87% on room air

## 2021-02-03 NOTE — CHART NOTE - NSCHARTNOTEFT_GEN_A_CORE
Nutrition Initial Assessment    Nutrition Consult Received: Yes [ x ]  No [   ]    Reason for Initial Nutrition Assessment: Consult for catabolic state, COVID-19.    Source of Information:  Information obtained from pt, pt's son whom translated, team and EMR    Admitted Dx: This is a 74 yo male with PMHx of HTN recent dx of COVID s/p monoclonal ab infusion who presents with worsening dyspnea, found to have possible superimposed bacterial PNA    Subjective Information:  Pt's son reports the pt had good PO intake throughout covid illness, he follows a low-sodium, low sugar diet. Although PO intake was good, pt feels he may have lost weight due to fevers.   Weight Hx: UBW reported as 154 pounds- same as dosing wt on 1/19- likely a stated wt. Aida VANDANA reports pt was 164 pounds in 11/2018 indicating wt loss over 2 year time frame, but noted pt now reports UBW as 154 pounds. This admission: dosing wt 179.8 pounds- likely inaccurate. Wt hx inconsistent- will continue to monitor weights as permitted.     Pt has NKFA, no difficulty chewing/swallowing and used to take herbal supplements but stopped recently to avoid medication interactions.     Current Nutrition Order: Diet, Soft:   DASH/TLC {Sodium & Cholesterol Restricted} (DASH) (01-27-21 @ 21:49)    PO Intake:   Good (%) [ x ]    Fair (50-75%) [   ]    Poor (<50%) [   ]   - Pt with great PO intake in-house, chicken legs and rice consumed for lunch. Pt requests chicken soup with dinner- will honor. Obtained hot water for pt per request. Will add double protein portions per pt's request.- Pt does not like cold drinks, does not want mighty shakes or apple juice. Denies offer for supplementation like Ensure.     GI: + Bm 1/29- pt's son endorses some constipation which was relieved today.    Skin Integrity: no pressure injuries  Edema: None    Labs:   01-29 Na136 mmol/L Glu 88 mg/dL K+ 4.3 mmol/L Cr  1.43 mg/dL<H> BUN 26 mg/dL<H> Phos n/a   Alb 3.2 g/dL<L> PAB n/a       Medications:  MEDICATIONS  (STANDING):  amLODIPine   Tablet 2.5 milliGRAM(s) Oral daily  aspirin enteric coated 81 milliGRAM(s) Oral daily  benzonatate 100 milliGRAM(s) Oral every 8 hours  doxazosin 4 milliGRAM(s) Oral at bedtime  enoxaparin Injectable 40 milliGRAM(s) SubCutaneous daily  guaiFENesin ER 1200 milliGRAM(s) Oral every 12 hours  piperacillin/tazobactam IVPB.. 3.375 Gram(s) IV Intermittent every 8 hours    MEDICATIONS  (PRN):  acetaminophen   Tablet .. 650 milliGRAM(s) Oral every 4 hours PRN Temp greater or equal to 38.5C (101.3F)  polyethylene glycol 3350 17 Gram(s) Oral daily PRN Constipation    Admitted Anthropometrics:    Height (cm): 170.2 (01-27-21 @ 12:58)  Weight (kg): 81.6 (01-27-21 @ 12:58)  BMI (kg/m2): 28.2 (01-27-21 @ 12:58)  IBW: 148 pounds    Nutrition Focused Physical Exam: Unable to complete due to limited isolation contact precautions at this time.     Estimated Energy Needs (25 kcal/kg- 30 kcal/kg): 2108-0351 kcal  Estimated Protein Needs (1.2 g/kg- 1.4 g/kg): 84-98 g  Estimated Fluid Needs (25 ml/kg- 30 ml/kg): 4108-0499 ml  Based on weight of:  pounds/70kg    [ x] Nutrition Diagnosis: Increased nutrient needs; protein and energy, related to increased physiological demand as evidenced by fevers, covid-19 respiratory illness    Goal: Pt will meet >75% estimated nutrient needs     Recommendations:  1) Continue current diet, will honor all of pt's food preferences and add double protein portions.   2) RD provided encouragement and emphasized need to continue adequate PO intake.  3) Monitor PO intake    RD to follow-up per protocol.  Chen Booker RD, CDN. Pager: [phone]
Patient presents with a diagnosis of Covid-19 superimposed bacterial pneumonia and will require home O2. Patient made aware that continuous O2 is needed with discharge.    Sat levels are as following:   - 93% on RA at rest   - 87% on RA with ambulation   - 93% on 2L NC with ambulation     Ewelina Alicia PA-C
Medicine Attending - Discharge Day Note:  ================================================    # Pneumonia due to COVID-19 virus with superimposed Bacterial pneumonia  # Acute kidney injury - likely ATN due to dehydration    The patient was discharged home with follow up from the [facility] program. He will remain on 2L/min and Eliquis for DVT pplx. Plan of care discussed with the patient's son extensively.     Discharge time spent 35 minutes.     Randy Carreon MD, ARNALDO  [phone]

## 2021-02-03 NOTE — DISCHARGE NOTE NURSING/CASE MANAGEMENT/SOCIAL WORK - PATIENT PORTAL LINK FT
You can access the FollowMyHealth Patient Portal offered by [facility] by registering at the following website: [email or web address]. By joining [facility] FollowMyHealth portal, you will also be able to view your health information using other applications (apps) compatible with our system.

## 2021-02-04 PROBLEM — Z87.438 PERSONAL HISTORY OF OTHER DISEASES OF MALE GENITAL ORGANS: Chronic | Status: ACTIVE | Noted: 2021-01-27

## 2021-02-04 PROBLEM — E78.5 HYPERLIPIDEMIA, UNSPECIFIED: Chronic | Status: ACTIVE | Noted: 2021-01-27

## 2021-02-05 ENCOUNTER — APPOINTMENT (OUTPATIENT)
Dept: PULMONOLOGY | Facility: CLINIC | Age: 76
End: 2021-02-05

## 2021-02-10 ENCOUNTER — APPOINTMENT (OUTPATIENT)
Dept: PULMONOLOGY | Facility: CLINIC | Age: 76
End: 2021-02-10
Payer: MEDICARE

## 2021-02-10 ENCOUNTER — NON-APPOINTMENT (OUTPATIENT)
Age: 76
End: 2021-02-10

## 2021-02-10 PROCEDURE — 99204 OFFICE O/P NEW MOD 45 MIN: CPT | Mod: CS,95

## 2021-02-10 NOTE — PLAN
[FreeTextEntry1] : REVIEWED:\par 1. CTA from 1/27/2021: RLL consolidation, minimal peripheral opacities; enlarged hilar LN; poor evaluation for PE\par 2. Hospital discharge paperwork\par 3. Hospital blood work\par \par Persistent COVID19 related hypoxemia. CTA atypical for COVID19 related disease given the single large RLL consolidation and otherwise minimal peripheral GGOs. The persistent hypoxemia is concerning but is hopefully secondary to the V/Q mismatch from the pneumonia. Will need repeat imaging in March/April to establish resolution. Finish Eliquis course; confirmed that he is not taking ASA. Will repeat lab at the end of February. Follow up in 1 week.

## 2021-02-10 NOTE — HISTORY OF PRESENT ILLNESS
[Home] : at home, [unfilled] , at the time of the visit. [Medical Office: ([facility])___] : at the medical office located in  [FreeTextEntry1] : \par \par 74yo with hx of NILA on OA, HTN, BPH, HLD was hospitalized at [facility] from 1/27/2021 to 2/3/2021. COVID positive on 1/18/2021. Received MAB, and Decadron as outpatient. Never received Remdesivir because of RAZ. Admitted with superimposed pneumonia, and treated with Vanc/Zosyn. Discharged on O2 and Eliquis. Currently still hypoxemic to 84% at rest without O2; in the low to mid 90s with O2 in place. Feels weak, but good appetite.

## 2021-02-24 ENCOUNTER — APPOINTMENT (OUTPATIENT)
Dept: PULMONOLOGY | Facility: CLINIC | Age: 76
End: 2021-02-24
Payer: MEDICARE

## 2021-02-24 DIAGNOSIS — U07.1 COVID-19: ICD-10-CM

## 2021-02-24 PROCEDURE — 99442: CPT | Mod: CS,95

## 2021-02-25 PROBLEM — U07.1 INFECTION DUE TO 2019 NOVEL CORONAVIRUS: Status: ACTIVE | Noted: 2021-02-25

## 2021-02-25 NOTE — HISTORY OF PRESENT ILLNESS
[Home] : at home, [unfilled] , at the time of the visit. [Medical Office: ([facility])___] : at the medical office located in  [Verbal consent obtained from patient] : the patient, [unfilled] [FreeTextEntry1] : \par \par 76yo with hx of NILA on OA, HTN, BPH, HLD was hospitalized at [facility] from 1/27/2021 to 2/3/2021. COVID positive on 1/18/2021. Received MAB, and Decadron as outpatient. Never received Remdesivir because of RAZ. Admitted with superimposed pneumonia, and treated with Vanc/Zosyn. Discharged on O2 and Eliquis. Currently still hypoxemic to 84% at rest without O2; in the low to mid 90s with O2 in place. Feels weak, but good appetite.\par \par 2/24/2021\par Doing well. Walking more. Better exercise. Oxygen saturations are above mid 90s with rest and ambulation. No longer using O2.

## 2021-02-25 NOTE — DISCUSSION/SUMMARY
[Time Spent: ___ minutes] : I have spent [unfilled] minutes with the patient on the telephone [FreeTextEntry1] : \par Resolving COVID19 pneumonia; resolved hypoxemia. CTA atypical for COVID19 related disease given the single large RLL consolidation and otherwise minimal peripheral GGOs; will need follow up imaging. Finish Eliquis course. Will follow up with PCP for imaging and blood work as he lives in [place] and cannot get to the [facility]. Follow up with [facility]/pulm as needed/\par \par  \par

## 2021-03-03 ENCOUNTER — APPOINTMENT (OUTPATIENT)
Dept: PULMONOLOGY | Facility: CLINIC | Age: 76
End: 2021-03-03

## 2021-03-26 NOTE — ED ADULT NURSE NOTE - NS ED NURSE LEVEL OF CONSCIOUSNESS MENTAL STATUS
Alert/Awake - C/w Seroquel 50mg at bedtime; monitor QTc if making adjustments  - frequent re-orientation  - Monitor EKG

## 2021-09-01 NOTE — ED ADULT TRIAGE NOTE - SPO2 (%)
New Prescription: Lotemax SM (loteprednol etabonate): drops,gel: 0.38% 1 drop three times a day into affected eye 12- 96 Terbinafine Counseling: Patient counseling regarding adverse effects of terbinafine including but not limited to headache, diarrhea, rash, upset stomach, liver function test abnormalities, itching, taste/smell disturbance, nausea, abdominal pain, and flatulence.  There is a rare possibility of liver failure that can occur when taking terbinafine.  The patient understands that a baseline LFT and kidney function test may be required. The patient verbalized understanding of the proper use and possible adverse effects of terbinafine.  All of the patient's questions and concerns were addressed.

## 2022-04-21 NOTE — CHART NOTE - SYMPTOMS
[Time Spent: ___ minutes] : I have spent [unfilled] minutes of time on the encounter. Malaise/Muscle Pain not due a chronic condition [FreeTextEntry3] : Patient being seen as per physician's primary plan of care.\par

## 2023-06-28 ENCOUNTER — APPOINTMENT (OUTPATIENT)
Dept: GASTROENTEROLOGY | Facility: CLINIC | Age: 78
End: 2023-06-28
Payer: MEDICARE

## 2023-06-28 DIAGNOSIS — K63.5 POLYP OF COLON: ICD-10-CM

## 2023-06-28 PROCEDURE — 99204 OFFICE O/P NEW MOD 45 MIN: CPT

## 2023-06-28 NOTE — ASSESSMENT
[FreeTextEntry1] : This is a 77 year old male here for an evaluation of colonic polyp.\par \par \par Plan  \par Colonoscopy with EMR 2 hrs \par GaviLyte \par \par Risks, benefits, and alternatives of colonoscopy discussed at length with patient including but not limited to bleeding , perforation, anesthesia related complication, aspiration, etc. Patient expressed understanding.\par \par Colonoscopy for evaluation of polyps, tumors, nodules with +/- biopsy and or cauterization w/ and or w/o clipping. \par

## 2023-06-28 NOTE — REASON FOR VISIT
[Home] : at home, [unfilled] , at the time of the visit. [Medical Office: ([facility])___] : at the medical office located in  [This encounter was initiated by telehealth (audio with video) and converted to telephone (audio only) due to technical difficulties.] : This encounter was initiated by telehealth (audio with video) and converted to telephone (audio only) due to technical difficulties. [Initial Evaluation] : an initial evaluation [FreeTextEntry2] : Jamey Howe [FreeTextEntry3] : son

## 2023-06-28 NOTE — HISTORY OF PRESENT ILLNESS
[FreeTextEntry1] : This is a 77 year old male here for an evaluation of colonic polyp. Referred by Dr. Austin. PMH of Pre-Dm, OA, HTN, BPH, HLD.  Cousin/father prostate cancer. Denies a family history of colon CA, gastric CA, high risk polyps, Inflammatory and autoimmune disease.Patient following Dr. Austin for a routine colonoscopy. Underwent a colonoscopy on 6/25/23 and it revealed a large sessile polyp of 30mm, starting in terminal ileum and extends alone the lips of the valve B/L. Pathology not available. Patient denies any N&V or abdominal pain. No blood or dark tarry stools. Normal caliber. Weight stable.  \par Smoke/Etoh: none\par Medication: No AC or Antiplatelet\par Allergy: sulfa drug\par \par

## 2023-07-25 ENCOUNTER — TRANSCRIPTION ENCOUNTER (OUTPATIENT)
Age: 78
End: 2023-07-25

## 2023-07-25 ENCOUNTER — OUTPATIENT (OUTPATIENT)
Dept: OUTPATIENT SERVICES | Facility: HOSPITAL | Age: 78
LOS: 1 days | End: 2023-07-25
Payer: MEDICARE

## 2023-07-25 ENCOUNTER — APPOINTMENT (OUTPATIENT)
Dept: GASTROENTEROLOGY | Facility: HOSPITAL | Age: 78
End: 2023-07-25

## 2023-07-25 ENCOUNTER — RESULT REVIEW (OUTPATIENT)
Age: 78
End: 2023-07-25

## 2023-07-25 VITALS
WEIGHT: 153 LBS | RESPIRATION RATE: 15 BRPM | SYSTOLIC BLOOD PRESSURE: 150 MMHG | DIASTOLIC BLOOD PRESSURE: 84 MMHG | HEART RATE: 65 BPM | HEIGHT: 66 IN | OXYGEN SATURATION: 100 % | TEMPERATURE: 98 F

## 2023-07-25 VITALS
OXYGEN SATURATION: 99 % | SYSTOLIC BLOOD PRESSURE: 154 MMHG | RESPIRATION RATE: 11 BRPM | DIASTOLIC BLOOD PRESSURE: 74 MMHG | HEART RATE: 74 BPM

## 2023-07-25 DIAGNOSIS — Z98.890 OTHER SPECIFIED POSTPROCEDURAL STATES: Chronic | ICD-10-CM

## 2023-07-25 DIAGNOSIS — K63.5 POLYP OF COLON: ICD-10-CM

## 2023-07-25 PROCEDURE — 45390 COLONOSCOPY W/RESECTION: CPT | Mod: 22

## 2023-07-25 DEVICE — GEL PURASTAT 3ML: Type: IMPLANTABLE DEVICE | Status: FUNCTIONAL

## 2023-07-25 RX ORDER — SODIUM CHLORIDE 9 MG/ML
1000 INJECTION, SOLUTION INTRAVENOUS
Refills: 0 | Status: DISCONTINUED | OUTPATIENT
Start: 2023-07-25 | End: 2023-08-09

## 2023-07-25 NOTE — ASU DISCHARGE PLAN (ADULT/PEDIATRIC) - NS MD DC FALL RISK RISK
For information on Fall & Injury Prevention, visit: [email or web address] OR  [email or web address] OR  https://www.cdc.gov/steadi/patient.html

## 2023-07-25 NOTE — ASU PATIENT PROFILE, ADULT - FALL HARM RISK - UNIVERSAL INTERVENTIONS
Bed in lowest position, wheels locked, appropriate side rails in place/Call bell, personal items and telephone in reach/Instruct patient to call for assistance before getting out of bed or chair/Non-slip footwear when patient is out of bed/[place] to call system/Physically safe environment - no spills, clutter or unnecessary equipment/Purposeful Proactive Rounding/Room/bathroom lighting operational, light cord in reach

## 2023-07-27 ENCOUNTER — INPATIENT (INPATIENT)
Facility: HOSPITAL | Age: 78
LOS: 0 days | Discharge: HOME | DRG: 378 | End: 2023-07-28
Attending: INTERNAL MEDICINE | Admitting: INTERNAL MEDICINE
Payer: MEDICARE

## 2023-07-27 ENCOUNTER — TRANSCRIPTION ENCOUNTER (OUTPATIENT)
Age: 78
End: 2023-07-27

## 2023-07-27 VITALS
WEIGHT: 153 LBS | HEART RATE: 57 BPM | HEIGHT: 66 IN | DIASTOLIC BLOOD PRESSURE: 70 MMHG | OXYGEN SATURATION: 97 % | TEMPERATURE: 98 F | SYSTOLIC BLOOD PRESSURE: 116 MMHG | RESPIRATION RATE: 20 BRPM

## 2023-07-27 DIAGNOSIS — N40.0 BENIGN PROSTATIC HYPERPLASIA WITHOUT LOWER URINARY TRACT SYMPTOMS: ICD-10-CM

## 2023-07-27 DIAGNOSIS — D64.9 ANEMIA, UNSPECIFIED: ICD-10-CM

## 2023-07-27 DIAGNOSIS — K92.2 GASTROINTESTINAL HEMORRHAGE, UNSPECIFIED: ICD-10-CM

## 2023-07-27 DIAGNOSIS — E78.5 HYPERLIPIDEMIA, UNSPECIFIED: ICD-10-CM

## 2023-07-27 DIAGNOSIS — Z98.890 OTHER SPECIFIED POSTPROCEDURAL STATES: Chronic | ICD-10-CM

## 2023-07-27 DIAGNOSIS — Z29.9 ENCOUNTER FOR PROPHYLACTIC MEASURES, UNSPECIFIED: ICD-10-CM

## 2023-07-27 DIAGNOSIS — I10 ESSENTIAL (PRIMARY) HYPERTENSION: ICD-10-CM

## 2023-07-27 LAB
ALBUMIN SERPL ELPH-MCNC: 3.1 G/DL — LOW (ref 3.5–5)
ALP SERPL-CCNC: 53 U/L — SIGNIFICANT CHANGE UP (ref 40–120)
ALT FLD-CCNC: 15 U/L DA — SIGNIFICANT CHANGE UP (ref 10–60)
ANION GAP SERPL CALC-SCNC: 6 MMOL/L — SIGNIFICANT CHANGE UP (ref 5–17)
APTT BLD: 29 SEC — SIGNIFICANT CHANGE UP (ref 24.5–35.6)
AST SERPL-CCNC: 11 U/L — SIGNIFICANT CHANGE UP (ref 10–40)
BASOPHILS # BLD AUTO: 0.04 K/UL — SIGNIFICANT CHANGE UP (ref 0–0.2)
BASOPHILS NFR BLD AUTO: 0.5 % — SIGNIFICANT CHANGE UP (ref 0–2)
BILIRUB SERPL-MCNC: 0.3 MG/DL — SIGNIFICANT CHANGE UP (ref 0.2–1.2)
BUN SERPL-MCNC: 16 MG/DL — SIGNIFICANT CHANGE UP (ref 7–18)
CALCIUM SERPL-MCNC: 8.3 MG/DL — LOW (ref 8.4–10.5)
CHLORIDE SERPL-SCNC: 111 MMOL/L — HIGH (ref 96–108)
CO2 SERPL-SCNC: 27 MMOL/L — SIGNIFICANT CHANGE UP (ref 22–31)
CREAT SERPL-MCNC: 1.4 MG/DL — HIGH (ref 0.5–1.3)
EGFR: 51 ML/MIN/1.73M2 — LOW
EOSINOPHIL # BLD AUTO: 0.15 K/UL — SIGNIFICANT CHANGE UP (ref 0–0.5)
EOSINOPHIL NFR BLD AUTO: 1.9 % — SIGNIFICANT CHANGE UP (ref 0–6)
GLUCOSE SERPL-MCNC: 154 MG/DL — HIGH (ref 70–99)
HCT VFR BLD CALC: 27.9 % — LOW (ref 39–50)
HCT VFR BLD CALC: 32.4 % — LOW (ref 39–50)
HCT VFR BLD CALC: 32.4 % — LOW (ref 39–50)
HGB BLD-MCNC: 10.3 G/DL — LOW (ref 13–17)
HGB BLD-MCNC: 10.6 G/DL — LOW (ref 13–17)
HGB BLD-MCNC: 9.4 G/DL — LOW (ref 13–17)
IMM GRANULOCYTES NFR BLD AUTO: 0.3 % — SIGNIFICANT CHANGE UP (ref 0–0.9)
INR BLD: 0.99 RATIO — SIGNIFICANT CHANGE UP (ref 0.85–1.18)
LACTATE SERPL-SCNC: 1.9 MMOL/L — SIGNIFICANT CHANGE UP (ref 0.7–2)
LIDOCAIN IGE QN: 116 U/L — SIGNIFICANT CHANGE UP (ref 73–393)
LYMPHOCYTES # BLD AUTO: 2.27 K/UL — SIGNIFICANT CHANGE UP (ref 1–3.3)
LYMPHOCYTES # BLD AUTO: 28.9 % — SIGNIFICANT CHANGE UP (ref 13–44)
MAGNESIUM SERPL-MCNC: 1.9 MG/DL — SIGNIFICANT CHANGE UP (ref 1.6–2.6)
MCHC RBC-ENTMCNC: 29.3 PG — SIGNIFICANT CHANGE UP (ref 27–34)
MCHC RBC-ENTMCNC: 29.7 PG — SIGNIFICANT CHANGE UP (ref 27–34)
MCHC RBC-ENTMCNC: 29.9 PG — SIGNIFICANT CHANGE UP (ref 27–34)
MCHC RBC-ENTMCNC: 31.8 GM/DL — LOW (ref 32–36)
MCHC RBC-ENTMCNC: 32.7 GM/DL — SIGNIFICANT CHANGE UP (ref 32–36)
MCHC RBC-ENTMCNC: 33.7 GM/DL — SIGNIFICANT CHANGE UP (ref 32–36)
MCV RBC AUTO: 88 FL — SIGNIFICANT CHANGE UP (ref 80–100)
MCV RBC AUTO: 91.3 FL — SIGNIFICANT CHANGE UP (ref 80–100)
MCV RBC AUTO: 92.3 FL — SIGNIFICANT CHANGE UP (ref 80–100)
MONOCYTES # BLD AUTO: 0.85 K/UL — SIGNIFICANT CHANGE UP (ref 0–0.9)
MONOCYTES NFR BLD AUTO: 10.8 % — SIGNIFICANT CHANGE UP (ref 2–14)
NEUTROPHILS # BLD AUTO: 4.52 K/UL — SIGNIFICANT CHANGE UP (ref 1.8–7.4)
NEUTROPHILS NFR BLD AUTO: 57.6 % — SIGNIFICANT CHANGE UP (ref 43–77)
NRBC # BLD: 0 /100 WBCS — SIGNIFICANT CHANGE UP (ref 0–0)
PLATELET # BLD AUTO: 146 K/UL — LOW (ref 150–400)
PLATELET # BLD AUTO: 161 K/UL — SIGNIFICANT CHANGE UP (ref 150–400)
PLATELET # BLD AUTO: 186 K/UL — SIGNIFICANT CHANGE UP (ref 150–400)
POTASSIUM SERPL-MCNC: 4.3 MMOL/L — SIGNIFICANT CHANGE UP (ref 3.5–5.3)
POTASSIUM SERPL-SCNC: 4.3 MMOL/L — SIGNIFICANT CHANGE UP (ref 3.5–5.3)
PROT SERPL-MCNC: 6.5 G/DL — SIGNIFICANT CHANGE UP (ref 6–8.3)
PROTHROM AB SERPL-ACNC: 11.3 SEC — SIGNIFICANT CHANGE UP (ref 9.5–13)
RBC # BLD: 3.17 M/UL — LOW (ref 4.2–5.8)
RBC # BLD: 3.51 M/UL — LOW (ref 4.2–5.8)
RBC # BLD: 3.55 M/UL — LOW (ref 4.2–5.8)
RBC # FLD: 14.2 % — SIGNIFICANT CHANGE UP (ref 10.3–14.5)
RBC # FLD: 14.3 % — SIGNIFICANT CHANGE UP (ref 10.3–14.5)
RBC # FLD: 14.6 % — HIGH (ref 10.3–14.5)
SODIUM SERPL-SCNC: 144 MMOL/L — SIGNIFICANT CHANGE UP (ref 135–145)
SURGICAL PATHOLOGY STUDY: SIGNIFICANT CHANGE UP
WBC # BLD: 12.19 K/UL — HIGH (ref 3.8–10.5)
WBC # BLD: 7.85 K/UL — SIGNIFICANT CHANGE UP (ref 3.8–10.5)
WBC # BLD: 7.87 K/UL — SIGNIFICANT CHANGE UP (ref 3.8–10.5)
WBC # FLD AUTO: 12.19 K/UL — HIGH (ref 3.8–10.5)
WBC # FLD AUTO: 7.85 K/UL — SIGNIFICANT CHANGE UP (ref 3.8–10.5)
WBC # FLD AUTO: 7.87 K/UL — SIGNIFICANT CHANGE UP (ref 3.8–10.5)

## 2023-07-27 PROCEDURE — 45382 COLONOSCOPY W/CONTROL BLEED: CPT

## 2023-07-27 PROCEDURE — 99291 CRITICAL CARE FIRST HOUR: CPT

## 2023-07-27 PROCEDURE — 93010 ELECTROCARDIOGRAM REPORT: CPT

## 2023-07-27 PROCEDURE — 99222 1ST HOSP IP/OBS MODERATE 55: CPT | Mod: 57,25

## 2023-07-27 PROCEDURE — 88305 TISSUE EXAM BY PATHOLOGIST: CPT | Mod: 26

## 2023-07-27 DEVICE — CLIP RESOLUTION 360 235CM: Type: IMPLANTABLE DEVICE | Status: FUNCTIONAL

## 2023-07-27 RX ORDER — ASPIRIN/CALCIUM CARB/MAGNESIUM 324 MG
1 TABLET ORAL
Qty: 0 | Refills: 0 | DISCHARGE

## 2023-07-27 RX ORDER — SODIUM CHLORIDE 9 MG/ML
1000 INJECTION, SOLUTION INTRAVENOUS
Refills: 0 | Status: DISCONTINUED | OUTPATIENT
Start: 2023-07-27 | End: 2023-07-28

## 2023-07-27 RX ORDER — PANTOPRAZOLE SODIUM 20 MG/1
80 TABLET, DELAYED RELEASE ORAL ONCE
Refills: 0 | Status: COMPLETED | OUTPATIENT
Start: 2023-07-27 | End: 2023-07-27

## 2023-07-27 RX ORDER — ONDANSETRON 8 MG/1
4 TABLET, FILM COATED ORAL ONCE
Refills: 0 | Status: COMPLETED | OUTPATIENT
Start: 2023-07-27 | End: 2023-07-27

## 2023-07-27 RX ORDER — SODIUM CHLORIDE 9 MG/ML
1000 INJECTION INTRAMUSCULAR; INTRAVENOUS; SUBCUTANEOUS ONCE
Refills: 0 | Status: COMPLETED | OUTPATIENT
Start: 2023-07-27 | End: 2023-07-27

## 2023-07-27 RX ORDER — TERAZOSIN HYDROCHLORIDE 10 MG/1
5 CAPSULE ORAL AT BEDTIME
Refills: 0 | Status: DISCONTINUED | OUTPATIENT
Start: 2023-07-27 | End: 2023-07-28

## 2023-07-27 RX ORDER — DOXAZOSIN MESYLATE 4 MG
4 TABLET ORAL AT BEDTIME
Refills: 0 | Status: DISCONTINUED | OUTPATIENT
Start: 2023-07-27 | End: 2023-07-27

## 2023-07-27 RX ORDER — AMLODIPINE BESYLATE 2.5 MG/1
1 TABLET ORAL
Qty: 0 | Refills: 0 | DISCHARGE

## 2023-07-27 RX ORDER — ATENOLOL 25 MG/1
0.5 TABLET ORAL
Qty: 0 | Refills: 0 | DISCHARGE

## 2023-07-27 RX ADMIN — PANTOPRAZOLE SODIUM 80 MILLIGRAM(S): 20 TABLET, DELAYED RELEASE ORAL at 12:37

## 2023-07-27 RX ADMIN — TERAZOSIN HYDROCHLORIDE 5 MILLIGRAM(S): 10 CAPSULE ORAL at 21:11

## 2023-07-27 RX ADMIN — SODIUM CHLORIDE 1000 MILLILITER(S): 9 INJECTION INTRAMUSCULAR; INTRAVENOUS; SUBCUTANEOUS at 11:32

## 2023-07-27 RX ADMIN — SODIUM CHLORIDE 75 MILLILITER(S): 9 INJECTION, SOLUTION INTRAVENOUS at 17:33

## 2023-07-27 NOTE — H&P ADULT - NSHPPHYSICALEXAM_GEN_ALL_CORE
GENERAL: NAD; Patient awake and speaking during exam;   HEAD:  Atraumatic, Normocephalic  EYES: EOMI, PERRLA, conjunctiva and sclera clear  ENMT: No tonsillar erythema, exudates, or enlargement; Moist mucous membranes  NECK: Supple, normal appearance, No JVD;   NERVOUS SYSTEM:  Alert & Oriented X3,  non focal  CHEST/LUNG: Lungs clear to auscultation bilaterally, No rales, rhonchi, wheezing   HEART: Regular rate and rhythm; No murmurs, rubs, or gallops  ABDOMEN: Soft, Nontender, Nondistended; Bowel sounds present; +BRBPR  EXTREMITIES:  2+ Peripheral Pulses, No clubbing, cyanosis, or edema  SKIN: No rashes or lesions;  Patient appears pale

## 2023-07-27 NOTE — ED PROVIDER NOTE - PROGRESS NOTE DETAILS
labs with anemia. as per family hgb was 12 at presurigcal testing, today at 10. given 2 units PRBC with improvement of symptoms. no active bleeding in the ED. GI consulted. will admit.

## 2023-07-27 NOTE — PATIENT PROFILE ADULT - FUNCTIONAL ASSESSMENT - BASIC MOBILITY ASSESSMENT TYPE
Right anterior hemorrhoid banded in the office  Will schedule patient for colonoscopy  Will schedule follow up after the colonoscopy  Continue multiple stool softeners  Avoid sitting and strain on the toilet Admission

## 2023-07-27 NOTE — ED ADULT NURSE REASSESSMENT NOTE - NS ED NURSE REASSESS COMMENT FT1
1125AM-- CODE FUSION called at this time. Pt resting in bed, awake, diaphoretic, MD Donovan at bedside. Pt placed on cardiac monitor, vitals stable. PRBC to be given as per order.

## 2023-07-27 NOTE — CONSULT NOTE ADULT - SUBJECTIVE AND OBJECTIVE BOX
[facility] GI CONSULTATION    Patient is a 77y old  Male who presents with a chief complaint of   HPI:    PMH/PSH:  PAST MEDICAL & SURGICAL HISTORY:  HTN (hypertension)      History of BPH      Lipidemia      H/O colonoscopy        FH:  FAMILY HISTORY:  Family history of essential hypertension        Social History:       MEDS:  MEDICATIONS  (STANDING):    MEDICATIONS  (PRN):    Allergies    sulfa drugs (Other)    Intolerances            CONSTITUTIONAL:  No weight loss, fever, chills, weakness or fatigue.  HEENT:  Eyes:  No visual loss, blurred vision, double vision or yellow sclerae. Ears, Nose, Throat:  No hearing loss, sneezing, congestion, runny nose or sore throat.  SKIN:  No rash or itching.  CARDIOVASCULAR:  No chest pain, chest pressure or chest discomfort. No palpitations or edema.  RESPIRATORY:  No shortness of breath, cough or sputum.  GASTROINTESTINAL:  SEE HPI  GENITOURINARY:  No dysuria, hematuria, urinary frequency  NEUROLOGICAL:  No headache, dizziness, syncope, paralysis, ataxia, numbness or tingling in the extremities. No change in bowel or bladder control.  MUSCULOSKELETAL:  No muscle, back pain, joint pain or stiffness.  HEMATOLOGIC:  No anemia, bleeding or bruising.  LYMPHATICS:  No enlarged nodes. No history of splenectomy.  PSYCHIATRIC:  No history of depression or anxiety.  ENDOCRINOLOGIC:  No reports of sweating, cold or heat intolerance. No polyuria or polydipsia.      ______________________________________________________________________  PHYSICAL EXAM:  T(C): 36.4 (07-27-23 @ 11:48), Max: 36.7 (07-27-23 @ 11:20)  HR: 58 (07-27-23 @ 12:13)  BP: 150/78 (07-27-23 @ 12:13)  RR: 16 (07-27-23 @ 12:13)  SpO2: 97% (07-27-23 @ 12:13)  Wt(kg): --      GEN: generalized weakness.  CVS: S1S2+  CHEST: clear to auscultation  ABD: soft , nontender, nondistended, bowel sounds present  EXTR: no cyanosis, no clubbing, no edema  NEURO: Awake and alert; oriented x3  SKIN:  warm;  non icteric    ______________________________________________________________________  LABS:                        10.3   7.85  )-----------( 186      ( 27 Jul 2023 11:20 )             32.4     07-27    144  |  111<H>  |  16  ----------------------------<  154<H>  4.3   |  27  |  1.40<H>    Ca    8.3<L>      27 Jul 2023 11:20  Mg     1.9     07-27    TPro  6.5  /  Alb  3.1<L>  /  TBili  0.3  /  DBili  x   /  AST  11  /  ALT  15  /  AlkPhos  53  07-27    LIVER FUNCTIONS - ( 27 Jul 2023 11:20 )  Alb: 3.1 g/dL / Pro: 6.5 g/dL / ALK PHOS: 53 U/L / ALT: 15 U/L DA / AST: 11 U/L / GGT: x           PT/INR - ( 27 Jul 2023 11:20 )   PT: 11.3 sec;   INR: 0.99 ratio         PTT - ( 27 Jul 2023 11:20 )  PTT:29.0 sec  ____________________________________________  IMAGING:               [facility]    Patient is a 77y old  Male who presents with a chief complaint of   HPI:  Patient is a 77 year old male, from home with PMH of HTN and HLD presented to the ED with bright red blood per rectum and passing out.  Patient was accompanied by his son who assisted in history.  Patient had recent routine Colonoscopy where a large 35mm polyp was identified.  patient than underwent a colonoscopy on 7/25 with removal of the large polyp.  Patient felt well after procedure and was without acute complaint.  Patient this AM noted to have BRBPR and per patients family had an episode where he passed out this morning which promoted them to bring patient in.  Patient currently denies chest pain, shortness of breath, nausea, vomiting, constipation, abdominal pain.     Pt admitted for acute lower  GIB.   GI consulted.     GI HPI:   Pt seen and examined in Main ED. Pt is Gibraltarian speaking , and prefers translation by son at bedside. Pt son endorses pt started having diarrhea with blood in stool. Pt developed lightheadedness. Pt son found pt on toilet "passed out", and noted blood in toilet.       PMH/PSH:  PAST MEDICAL & SURGICAL HISTORY:  HTN (hypertension)      History of BPH      Lipidemia      H/O colonoscopy        FH:  FAMILY HISTORY:  Family history of essential hypertension        Social History:       MEDS:  MEDICATIONS  (STANDING):    MEDICATIONS  (PRN):    Allergies    sulfa drugs (Other)    Intolerances            CONSTITUTIONAL:  No weight loss, fever, chills, weakness or fatigue.  HEENT:  Eyes:  No visual loss, blurred vision, double vision or yellow sclerae. Ears, Nose, Throat:  No hearing loss, sneezing, congestion, runny nose or sore throat.  SKIN:  No rash or itching.  CARDIOVASCULAR:  No chest pain, chest pressure or chest discomfort. No palpitations or edema.  RESPIRATORY:  No shortness of breath, cough or sputum.  GASTROINTESTINAL:  SEE HPI  GENITOURINARY:  No dysuria, hematuria, urinary frequency  NEUROLOGICAL:  No headache, dizziness, syncope, paralysis, ataxia, numbness or tingling in the extremities. No change in bowel or bladder control.  MUSCULOSKELETAL:  No muscle, back pain, joint pain or stiffness.  HEMATOLOGIC:  No anemia, bleeding or bruising.  LYMPHATICS:  No enlarged nodes. No history of splenectomy.  PSYCHIATRIC:  No history of depression or anxiety.  ENDOCRINOLOGIC:  No reports of sweating, cold or heat intolerance. No polyuria or polydipsia.      ______________________________________________________________________  PHYSICAL EXAM:  T(C): 36.4 (07-27-23 @ 11:48), Max: 36.7 (07-27-23 @ 11:20)  HR: 58 (07-27-23 @ 12:13)  BP: 150/78 (07-27-23 @ 12:13)  RR: 16 (07-27-23 @ 12:13)  SpO2: 97% (07-27-23 @ 12:13)  Wt(kg): --      GEN: generalized weakness.  CVS: S1S2+  CHEST: clear to auscultation  ABD: soft , nontender, nondistended, bowel sounds present  EXTR: no cyanosis, no clubbing, no edema  NEURO: Awake and alert; oriented x3  SKIN:  warm;  non icteric    ______________________________________________________________________  LABS:                        10.3   7.85  )-----------( 186      ( 27 Jul 2023 11:20 )             32.4     07-27    144  |  111<H>  |  16  ----------------------------<  154<H>  4.3   |  27  |  1.40<H>    Ca    8.3<L>      27 Jul 2023 11:20  Mg     1.9     07-27    TPro  6.5  /  Alb  3.1<L>  /  TBili  0.3  /  DBili  x   /  AST  11  /  ALT  15  /  AlkPhos  53  07-27    LIVER FUNCTIONS - ( 27 Jul 2023 11:20 )  Alb: 3.1 g/dL / Pro: 6.5 g/dL / ALK PHOS: 53 U/L / ALT: 15 U/L DA / AST: 11 U/L / GGT: x           PT/INR - ( 27 Jul 2023 11:20 )   PT: 11.3 sec;   INR: 0.99 ratio         PTT - ( 27 Jul 2023 11:20 )  PTT:29.0 sec  ____________________________________________  IMAGING:

## 2023-07-27 NOTE — CONSULT NOTE ADULT - NS ATTEND AMEND GEN_ALL_CORE FT
Pt seen and examined. Presenting with multiple episodes of hematochezia at home with syncopal episode. Hb 10, tachycardic intially however now VSS after 2U PRBCs. Likely post EMR bleed given recent large ileocecal polyp removal. Will plan for urgent colonoscopy today. Maintain NPO.      Total time spent to complete patient's bedside assessment, physical examination, review medical chart including labs & imaging, discuss medical plan of care with housestaff was more than 50 minutes. Pt seen and examined. Presenting with multiple episodes of hematochezia at home with syncopal episode. Hb 10, tachycardic intially however now VSS after 2U PRBCs. Likely post EMR bleed given recent large ileocecal polyp removal. Will plan for urgent colonoscopy today. Maintain NPO.      Total time spent to complete patient's bedside assessment, physical examination, review medical chart including labs & imaging, discuss medical plan of care with housestaff was more than 50 minutes.j

## 2023-07-27 NOTE — ED PROVIDER NOTE - OBJECTIVE STATEMENT
77 year old male PMH HTN coming in with multiple episode of BRBPR and syncope. pt had a colonoscopy 2 days ago and had a large polyp removed and initially feeling well until this morning.

## 2023-07-27 NOTE — ED ADULT NURSE NOTE - NS ED NURSE DISCH DISPOSITION
Admitted Localized Dermabrasion With Wire Brush Text: The patient was draped in routine manner.  Localized dermabrasion using 3 x 17 mm wire brush was performed in routine manner to papillary dermis. This spot dermabrasion is being performed to complete skin cancer reconstruction. It also will eliminate the other sun damaged precancerous cells that are known to be part of the regional effect of a lifetime's worth of sun exposure. This localized dermabrasion is therapeutic and should not be considered cosmetic in any regard. Localized Dermabrasion Text: The patient was draped in routine manner.  Localized dermabrasion using 3 x 17 mm wire brush was performed in routine manner to papillary dermis. This spot dermabrasion is being performed to complete skin cancer reconstruction. It also will eliminate the other sun damaged precancerous cells that are known to be part of the regional effect of a lifetime's worth of sun exposure. This localized dermabrasion is therapeutic and should not be considered cosmetic in any regard.

## 2023-07-27 NOTE — H&P ADULT - PROBLEM SELECTOR PLAN 3
- Patient with history of HTN  - Monitor BP - Patient with history of HTN  - Continue patients home enalapril 5mg qd  - Monitor BP

## 2023-07-27 NOTE — ED ADULT TRIAGE NOTE - ESI TRIAGE ACUITY LEVEL, MLM
2 Valtrex Counseling: I discussed with the patient the risks of valacyclovir including but not limited to kidney damage, nausea, vomiting and severe allergy.  The patient understands that if the infection seems to be worsening or is not improving, they are to call.

## 2023-07-27 NOTE — H&P ADULT - HISTORY OF PRESENT ILLNESS
Patient is a 77 year old male, from home with PMH of HTN presented to the ED with bright red blood per rectum and passing out.  Patient was accompanied by his son who assisted in history.  patient had recent routine Colonoscopy where a large 35mm polyp was identified.  patient than underwent a colonoscopy on 7/25 with removal of the large polyp.  Patient felt well after procedure and was without acute complaint. patient this AM noted to have BRBPR and per patients family had an episode  Patient is a 77 year old male, from home with PMH of HTN and HLD presented to the ED with bright red blood per rectum and passing out.  Patient was accompanied by his son who assisted in history.  Patient had recent routine Colonoscopy where a large 35mm polyp was identified.  patient than underwent a colonoscopy on 7/25 with removal of the large polyp.  Patient felt well after procedure and was without acute complaint.  Patient this AM noted to have BRBPR and per patients family had an episode where he passed out this morning which promoted them to bring patient in.  Patient currently denies chest pain, shortness of breath, nausea, vomiting, constipation, abdominal pain.  Patient is a 77 year old male, from home with PMH of HTN, HLD, and BPH presented to the ED with bright red blood per rectum and passing out.  Patient was accompanied by his son who assisted in history.  Patient had recent routine Colonoscopy where a large 35mm polyp was identified.  patient than underwent a colonoscopy on 7/25 with removal of the large polyp.  Patient felt well after procedure and was without acute complaint.  Patient this AM noted to have BRBPR and per patients family had an episode where he passed out this morning which promoted them to bring patient in.  Patient currently denies chest pain, shortness of breath, nausea, vomiting, constipation, abdominal pain.

## 2023-07-27 NOTE — PACU DISCHARGE NOTE - MENTAL STATUS: PATIENT PARTICIPATION
Mom called, stated pt was seen on 11/19 for abd pain and constipation. Per mom, constipation has resolved with Miralax, but pt still intermittently c/o abd pain and has been experiencing cough, congestion, HA and feeling \"achy.\" Scheduled recheck this morning.   
Awake
Awake

## 2023-07-27 NOTE — ED ADULT NURSE NOTE - OBJECTIVE STATEMENT
Pt BIB EMS c/o rectal bleeding x today. Upon assessment, pt drowsy, diaphoretic, arouses to verbal stimuli. Pt had polyp removal surgery x 2 days. MD Donovan at bedside.

## 2023-07-27 NOTE — H&P ADULT - PROBLEM SELECTOR PLAN 5
- SCD for DVT PPX (Hold Chemical DVT PPX in setting of acute lower GIB) - Patient with history of BPH  - Continue home Terazosin 5mg qd

## 2023-07-27 NOTE — H&P ADULT - PROBLEM SELECTOR PLAN 1
- Patient s/p colonoscopy 2 days prior with removal of 35mm polyp  - Patient with Bright red blood per rectum this AM  - Patient with episode of passing out, likely 2/2 blood loss  - Patient hemodynamicly stable in ED  - Hb: 10.3 on admission  - Transfusion protocol activated by ED  - s/p 2U PRBC in ED  - Urgent Colonoscopy today  - F/U repeat CBC  - Transfuse as indicated to keep Hb>7  - NPO  - GI Consulted Dr. Pérez

## 2023-07-27 NOTE — ED ADULT NURSE REASSESSMENT NOTE - NS ED NURSE REASSESS COMMENT FT1
1148am-- 2nd unit of CODE FUSION PRBC completed at this time. Pt resting in bed, A&OX3, on cardiac monitor, denies chest pain, no SOB noted. MD Donovan at bedside.

## 2023-07-27 NOTE — ED ADULT NURSE NOTE - NSFALLHARMRISKINTERV_ED_ALL_ED

## 2023-07-27 NOTE — CONSULT NOTE ADULT - ASSESSMENT
BRBRPR  Symptomatic anemia    -Urgent Colonoscopy. Please give TWEx 1 now.     This note and its recommendations herein are preliminary until such time as cosigned by an attending.       BRBRPR  Symptomatic anemia    -Urgent Colonoscopy. Please give TWEx 1 now.  (pls transfer pt to EDHold or floor )    This note and its recommendations herein are preliminary until such time as cosigned by an attending.   Patient is a 77 year old male, from home with PMH of HTN and HLD presented to the ED with bright red blood per rectum and passing out.  In the ED a code fusion was called on patient.  patient had 2U PRBC transfused.  patients pre transfustion Hb was 10.3.  Patient will require urgent Colonoscopy per GI Dr. Pérez.  Patient is being admitted to medicine for acute Lower GI Bleed      BRBRPR/hematochezia  Symptomatic anemia  Acute GIB  s/p 2 units PRBC in ED. Hgb 10.3.   -Urgent Colonoscopy. Please give TWEx 1 now.  (pls transfer pt to EDHold or floor )  Monitor CBC q8h and transfuse to 8 or symptomatic.   This note and its recommendations herein are preliminary until such time as cosigned by an attending.

## 2023-07-27 NOTE — PATIENT PROFILE ADULT - FALL HARM RISK - HARM RISK INTERVENTIONS

## 2023-07-27 NOTE — H&P ADULT - NSHPREVIEWOFSYSTEMS_GEN_ALL_CORE
CONSTITUTIONAL: No fever, weight loss, or fatigue  EYES: No eye pain, visual disturbances, or discharge  ENT:  No difficulty hearing, tinnitus, vertigo; No sinus or throat pain  NECK: No pain or stiffness  RESPIRATORY: No cough, wheezing, chills or hemoptysis; No Shortness of Breath  CARDIOVASCULAR: No chest pain, palpitations, passing out, dizziness, or leg swelling  GASTROINTESTINAL: No abdominal or epigastric pain. No nausea, vomiting, or hematemesis; No diarrhea or constipation. No melena; + hematochezia.  GENITOURINARY: No dysuria, frequency, hematuria, or incontinence  NEUROLOGICAL: + passing out; No headaches, memory loss, loss of strength, numbness, or tremors  SKIN: No itching, burning, rashes, or lesions Bi-Rhombic Flap Text: The defect edges were debeveled with a #15 scalpel blade.  Given the location of the defect and the proximity to free margins a bi-rhombic flap was deemed most appropriate.  Using a sterile surgical marker, an appropriate rhombic flap was drawn incorporating the defect. The area thus outlined was incised deep to adipose tissue with a #15 scalpel blade.  The skin margins were undermined to an appropriate distance in all directions utilizing iris scissors.

## 2023-07-27 NOTE — H&P ADULT - ASSESSMENT
Patient is a 77 year old male, from home with PMH of HTN and HLD presented to the ED with bright red blood per rectum and passing out.  In the ED a code fusion was called on patient.  patient had 2U PRBC transfused.  patients pre transfustion Hb was 10.3.  Patient will require urgent Colonoscopy per GI Dr. Pérez.  Patient is being admitted to medicine for acute Lower GI Bleed Patient is a 77 year old male, from home with PMH of HTN, HLD, and BPH presented to the ED with bright red blood per rectum and passing out.  In the ED a code fusion was called on patient.  patient had 2U PRBC transfused.  patients pre transfustion Hb was 10.3.  Patient will require urgent Colonoscopy per GI Dr. Pérez.  Patient is being admitted to medicine for acute Lower GI Bleed

## 2023-07-28 ENCOUNTER — TRANSCRIPTION ENCOUNTER (OUTPATIENT)
Age: 78
End: 2023-07-28

## 2023-07-28 VITALS — SYSTOLIC BLOOD PRESSURE: 164 MMHG | HEART RATE: 69 BPM | DIASTOLIC BLOOD PRESSURE: 76 MMHG

## 2023-07-28 LAB
ANION GAP SERPL CALC-SCNC: 6 MMOL/L — SIGNIFICANT CHANGE UP (ref 5–17)
BUN SERPL-MCNC: 12 MG/DL — SIGNIFICANT CHANGE UP (ref 7–18)
CALCIUM SERPL-MCNC: 7.7 MG/DL — LOW (ref 8.4–10.5)
CHLORIDE SERPL-SCNC: 113 MMOL/L — HIGH (ref 96–108)
CO2 SERPL-SCNC: 25 MMOL/L — SIGNIFICANT CHANGE UP (ref 22–31)
CREAT SERPL-MCNC: 1.19 MG/DL — SIGNIFICANT CHANGE UP (ref 0.5–1.3)
EGFR: 63 ML/MIN/1.73M2 — SIGNIFICANT CHANGE UP
GLUCOSE SERPL-MCNC: 88 MG/DL — SIGNIFICANT CHANGE UP (ref 70–99)
HCT VFR BLD CALC: 32.1 % — LOW (ref 39–50)
HGB BLD-MCNC: 10.6 G/DL — LOW (ref 13–17)
MAGNESIUM SERPL-MCNC: 2 MG/DL — SIGNIFICANT CHANGE UP (ref 1.6–2.6)
MCHC RBC-ENTMCNC: 29.8 PG — SIGNIFICANT CHANGE UP (ref 27–34)
MCHC RBC-ENTMCNC: 33 GM/DL — SIGNIFICANT CHANGE UP (ref 32–36)
MCV RBC AUTO: 90.2 FL — SIGNIFICANT CHANGE UP (ref 80–100)
NRBC # BLD: 0 /100 WBCS — SIGNIFICANT CHANGE UP (ref 0–0)
PHOSPHATE SERPL-MCNC: 2 MG/DL — LOW (ref 2.5–4.5)
PLATELET # BLD AUTO: 161 K/UL — SIGNIFICANT CHANGE UP (ref 150–400)
POTASSIUM SERPL-MCNC: 4 MMOL/L — SIGNIFICANT CHANGE UP (ref 3.5–5.3)
POTASSIUM SERPL-SCNC: 4 MMOL/L — SIGNIFICANT CHANGE UP (ref 3.5–5.3)
RBC # BLD: 3.56 M/UL — LOW (ref 4.2–5.8)
RBC # FLD: 14.7 % — HIGH (ref 10.3–14.5)
SODIUM SERPL-SCNC: 144 MMOL/L — SIGNIFICANT CHANGE UP (ref 135–145)
WBC # BLD: 6.71 K/UL — SIGNIFICANT CHANGE UP (ref 3.8–10.5)
WBC # FLD AUTO: 6.71 K/UL — SIGNIFICANT CHANGE UP (ref 3.8–10.5)

## 2023-07-28 PROCEDURE — 83735 ASSAY OF MAGNESIUM: CPT

## 2023-07-28 PROCEDURE — 93005 ELECTROCARDIOGRAM TRACING: CPT

## 2023-07-28 PROCEDURE — 85730 THROMBOPLASTIN TIME PARTIAL: CPT

## 2023-07-28 PROCEDURE — 99232 SBSQ HOSP IP/OBS MODERATE 35: CPT

## 2023-07-28 PROCEDURE — 86901 BLOOD TYPING SEROLOGIC RH(D): CPT

## 2023-07-28 PROCEDURE — 84100 ASSAY OF PHOSPHORUS: CPT

## 2023-07-28 PROCEDURE — 82962 GLUCOSE BLOOD TEST: CPT

## 2023-07-28 PROCEDURE — 36430 TRANSFUSION BLD/BLD COMPNT: CPT

## 2023-07-28 PROCEDURE — 86900 BLOOD TYPING SEROLOGIC ABO: CPT

## 2023-07-28 PROCEDURE — 86923 COMPATIBILITY TEST ELECTRIC: CPT

## 2023-07-28 PROCEDURE — 83605 ASSAY OF LACTIC ACID: CPT

## 2023-07-28 PROCEDURE — 85027 COMPLETE CBC AUTOMATED: CPT

## 2023-07-28 PROCEDURE — 85025 COMPLETE CBC W/AUTO DIFF WBC: CPT

## 2023-07-28 PROCEDURE — 99285 EMERGENCY DEPT VISIT HI MDM: CPT

## 2023-07-28 PROCEDURE — 83690 ASSAY OF LIPASE: CPT

## 2023-07-28 PROCEDURE — 80053 COMPREHEN METABOLIC PANEL: CPT

## 2023-07-28 PROCEDURE — P9040: CPT

## 2023-07-28 PROCEDURE — 86850 RBC ANTIBODY SCREEN: CPT

## 2023-07-28 PROCEDURE — 96374 THER/PROPH/DIAG INJ IV PUSH: CPT

## 2023-07-28 PROCEDURE — C1889: CPT

## 2023-07-28 PROCEDURE — 80048 BASIC METABOLIC PNL TOTAL CA: CPT

## 2023-07-28 PROCEDURE — 36415 COLL VENOUS BLD VENIPUNCTURE: CPT

## 2023-07-28 PROCEDURE — 88305 TISSUE EXAM BY PATHOLOGIST: CPT

## 2023-07-28 PROCEDURE — 85610 PROTHROMBIN TIME: CPT

## 2023-07-28 RX ORDER — ATENOLOL 25 MG/1
12.5 TABLET ORAL ONCE
Refills: 0 | Status: COMPLETED | OUTPATIENT
Start: 2023-07-28 | End: 2023-07-28

## 2023-07-28 RX ORDER — SODIUM,POTASSIUM PHOSPHATES 278-250MG
1 POWDER IN PACKET (EA) ORAL ONCE
Refills: 0 | Status: COMPLETED | OUTPATIENT
Start: 2023-07-28 | End: 2023-07-28

## 2023-07-28 RX ORDER — DIPHENHYDRAMINE HCL 50 MG
25 CAPSULE ORAL ONCE
Refills: 0 | Status: COMPLETED | OUTPATIENT
Start: 2023-07-28 | End: 2023-07-28

## 2023-07-28 RX ADMIN — Medication 25 MILLIGRAM(S): at 11:29

## 2023-07-28 RX ADMIN — Medication 1 PACKET(S): at 11:29

## 2023-07-28 RX ADMIN — Medication 5 MILLIGRAM(S): at 17:10

## 2023-07-28 RX ADMIN — ATENOLOL 12.5 MILLIGRAM(S): 25 TABLET ORAL at 18:28

## 2023-07-28 RX ADMIN — Medication 5 MILLIGRAM(S): at 05:35

## 2023-07-28 NOTE — DISCHARGE NOTE PROVIDER - HOSPITAL COURSE
Patient is a 77 year old male, from home with PMH of HTN, HLD, and BPH presented to the ED with bright red blood per rectum and passing out.  Patient was accompanied by his son who assisted in history.  Patient had recent routine Colonoscopy where a large 35mm polyp was identified.  patient than underwent a colonoscopy on 7/25 with removal of the large polyp.  Patient felt well after procedure and was without acute complaint.  Patient this AM noted to have BRBPR and per patients family had an episode where he passed out this morning which promoted them to bring patient in. admitted to medicine for acute lower GI bleed.   In the ED a code fusion was called on patient.  patient had 2U PRBC transfused.  patients pre transfustion Hb was 10.3.   underwent urgent Colonoscopy per GI Dr. Pérez. noted with:   Contents Large amount of old blood and clot seen throughout the left colon  limiting visualization.  Moderate amount of solid brown stool in the right colon limiting visualization.  Extensive lavage performed to identify EMR site.  Excavated lesions Multiple diverticula were seen in the whole colon.  Mucosa The prior EMR site was visualized at the ileocecal valve extending into  the terminal ileum. The site was ulcerated with areas of friable mucosal  contact-oozing and hematin spots. There was one small suspicious-appearing  vessel seen on the terminal ileal side. For hemostasis, one hemostatic clip (MR  conditional) was placed over the vessel. The clip was in good position.  Nexpowder was then applied to the entire resection bed to treat the friable  mucosal oozing with good effect. There was no bleeding at conclusion.    diet advanced to clear, then regular diet, patient tolerated well   no further episodes of rectal bleeding noted     Given patient's improved clinical status and current hemodynamic stability decision was made to discharge. Pt is stable for discharge per attending and is advised to f/u with PCP as out-patient. Please refer to Pt's complete medical chart with documents for a full hospital course, for this is only a brief summary.         Patient is a 77 year old male, from home with PMH of HTN, HLD, and BPH presented to the ED with bright red blood per rectum and passing out.  Patient was accompanied by his son who assisted in history.  Patient had recent routine Colonoscopy where a large 35mm polyp was identified.  patient than underwent a colonoscopy on 7/25 with removal of the large polyp.  Patient felt well after procedure and was without acute complaint.  Patient this AM noted to have BRBPR and per patients family had an episode where he passed out this morning which promoted them to bring patient in. admitted to medicine for acute lower GI bleed.   In the ED a code fusion was called on patient.  patient had 2U PRBC transfused.  patients pre transfusion Hb was 10.3.   underwent urgent Colonoscopy per GI Dr. Pérez. noted with:   Contents Large amount of old blood and clot seen throughout the left colon  limiting visualization.  Moderate amount of solid brown stool in the right colon limiting visualization.  Extensive lavage performed to identify EMR site.  Excavated lesions Multiple diverticula were seen in the whole colon.  Mucosa The prior EMR site was visualized at the ileocecal valve extending into  the terminal ileum. The site was ulcerated with areas of friable mucosal  contact-oozing and hematin spots. There was one small suspicious-appearing  vessel seen on the terminal ileal side. For hemostasis, one hemostatic clip (MR  conditional) was placed over the vessel. The clip was in good position.  Nexpowder was then applied to the entire resection bed to treat the friable  mucosal oozing with good effect. There was no bleeding at conclusion.    diet advanced to clear, then regular diet, patient tolerated well   no further episodes of rectal bleeding noted     Given patient's improved clinical status and current hemodynamic stability decision was made to discharge. Pt is stable for discharge per attending and is advised to f/u with PCP as out-patient. Please refer to Pt's complete medical chart with documents for a full hospital course, for this is only a brief summary.

## 2023-07-28 NOTE — PROGRESS NOTE ADULT - NS ATTEND AMEND GEN_ALL_CORE FT
Patient seen and examined. Doing well post colonoscopy yesterday. No further BMs/bleeding. Hb stable at 10. VSS. Tolerating regular diet. Wishes to go home. Given appropriate precautions/instructions at home. Reconsult GI PRN.    Total time spent to complete patient's bedside assessment, physical examination, review medical chart including labs & imaging, discuss medical plan of care with housestaff was more than 25 minutes

## 2023-07-28 NOTE — DISCHARGE NOTE PROVIDER - DISCHARGE SERVICE FOR PATIENT
on the discharge service for the patient. I have reviewed and made amendments to the documentation where necessary. Suturegard Intro: Intraoperative tissue expansion was performed, utilizing the SUTUREGARD device, in order to reduce wound tension.

## 2023-07-28 NOTE — DISCHARGE NOTE PROVIDER - NSDCMRMEDTOKEN_GEN_ALL_CORE_FT
enalapril 5 mg oral tablet: 1 tab(s) orally once a day  terazosin 5 mg oral capsule: 1 cap(s) orally once a day (at bedtime)   atenolol 25 mg oral tablet: 0.5 tab(s) orally once a day  enalapril 5 mg oral tablet: 1 tab(s) orally once a day  terazosin 5 mg oral capsule: 1 cap(s) orally once a day (at bedtime)

## 2023-07-28 NOTE — DISCHARGE NOTE PROVIDER - NSDCCPCAREPLAN_GEN_ALL_CORE_FT
PRINCIPAL DISCHARGE DIAGNOSIS  Diagnosis: Acute lower GI bleeding  Assessment and Plan of Treatment: you presented to the hospital with Acute rectal bleeding, you recieved 2 units of PRBC, and underwent an urgent colonoscopy with Dr Pérez.   Contents Large amount of old blood and clot seen throughout the left colon   Moderate amount of solid brown stool in the right colon limiting visualization.  Extensive lavage performed to identify EMR site.  Excavated lesions Multiple diverticula were seen in the whole colon.  Mucosa The prior EMR site was visualized at the ileocecal valve extending into  the terminal ileum. The site was ulcerated with areas of friable mucosal  contact-oozing and hematin spots. There was one small suspicious-appearing  vessel seen on the terminal ileal side. For hemostasis, one hemostatic clip (MR  conditional) was placed over the vessel. The clip was in good position.  Nexpowder was then applied to the entire resection bed to treat the friable  mucosal oozing with good effect. There was no bleeding at conclusion.  Protruding lesions Medium internal and external hemorrhoids were noted  your diet was advanced and you tolerated well.   there was no further episodes of rectal bleeding noted  please follow up with your gastroenterologist outpatient for further follow up.   GI Bleed affects the colon and rectum.   Lower GI Bleed signs and symptoms to notify your health care provider are bright red bloody bowel movements.   Take your medications as prescribed by your Gastroenterologist.  Avoid NSAIDs unless your Health Care Provider tells you that it is ok (Aspirin, Ibuprofen, Advil, Motrin, Aleve).  Follow up with your Gastroenterologist within 1-2 weeks of discharge.        SECONDARY DISCHARGE DIAGNOSES  Diagnosis: HTN (hypertension)  Assessment and Plan of Treatment: Follow up with your medical doctor to establish long term blood pressure treatment goals.   Low salt diet  Activity as tolerated.  Take all medication as prescribed.  Follow up with your medical doctor for routine blood pressure monitoring at your next visit.  Notify your doctor if you have any of the following symptoms:   Dizziness, Lightheadedness, Blurry vision, Headache, Chest pain, Shortness of breath      Diagnosis: BPH (benign prostatic hyperplasia)  Assessment and Plan of Treatment: You have an enlarged prostate gland which gets bigger as men get older - it is a very common problem and has nothing to do with prostate cancer  Call your doctor if you are urinating more frequently, have trouble starting to urinate, have weak stream, urine leaking or dribbling, and feeling as though bladder is not empty after urination  Your doctor will monitor your prostate with a rectal exam as well as urine or blood testing  You can help yourself by reducing the amount of fluid you drink before going to bed, limiting the amount of alcohol & caffeine you drink   Avoid cold & allergy medication that contain decongestants or antihistamines which make BPH symptoms worse  You can also "double void" by waiting a moment after urinating & trying again  Take your medication as prescribed - one medication helps to relax the muscle around the urethra and the other medication you may take prevents the prostate from growing more or even shrinking the prostate

## 2023-07-28 NOTE — DISCHARGE NOTE NURSING/CASE MANAGEMENT/SOCIAL WORK - PATIENT PORTAL LINK FT
You can access the FollowMyHealth Patient Portal offered by [facility] by registering at the following website: [email or web address]. By joining [facility]’s FollowMyHealth portal, you will also be able to view your health information using other applications (apps) compatible with our system.

## 2023-07-28 NOTE — DISCHARGE NOTE PROVIDER - CARE PROVIDER_API CALL
Malena Tolentino  Internal Medicine  [address] [facility] [address] [address]  Phone: [phone]  Fax: [phone]  Established Patient  Follow Up Time: 1 week

## 2023-07-28 NOTE — PROGRESS NOTE ADULT - SUBJECTIVE AND OBJECTIVE BOX
GI PROGRESS NOTE    Patient is a 77y old  Male who presents with a chief complaint of Lower GI Bleed (27 Jul 2023 14:23)      HPI:  Patient is a 77 year old male, from home with PMH of HTN, HLD, and BPH presented to the ED with bright red blood per rectum and passing out.  Patient was accompanied by his son who assisted in history.  Patient had recent routine Colonoscopy where a large 35mm polyp was identified.  patient than underwent a colonoscopy on 7/25 with removal of the large polyp.  Patient felt well after procedure and was without acute complaint.  Patient this AM noted to have BRBPR and per patients family had an episode where he passed out this morning which promoted them to bring patient in.  Patient currently denies chest pain, shortness of breath, nausea, vomiting, constipation, abdominal pain.  (27 Jul 2023 14:23)      GI HPI   Patient resting in bed. Tolerating clears. HH trending up. No BMs except some stool smear (dark colored). No other issues     ______________________________________________________________________  PMH/PSH:  PAST MEDICAL & SURGICAL HISTORY:  HTN (hypertension)      History of BPH      Lipidemia      H/O colonoscopy        ______________________________________________________________________  MEDS:  MEDICATIONS  (STANDING):  enalapril 5 milliGRAM(s) Oral at bedtime  lactated ringers. 1000 milliLiter(s) (75 mL/Hr) IV Continuous <Continuous>  terazosin 5 milliGRAM(s) Oral at bedtime    MEDICATIONS  (PRN):    ______________________________________________________________________  ALL:   Allergies    sulfa drugs (Other)    Intolerances      ______________________________________________________________________  SH: Social History:  lives at home with family.  Denies alcohol and tobacco use (27 Jul 2023 14:23)    ______________________________________________________________________  FH:  FAMILY HISTORY:  Family history of essential hypertension      ______________________________________________________________________  ROS:    CONSTITUTIONAL:  No weight loss, fever, chills, weakness or fatigue.    HEENT:  Eyes:  No visual loss, blurred vision, double vision or yellow sclerae. Ears, Nose, Throat:  No hearing loss, sneezing, congestion, runny nose or sore throat.    SKIN:  No rash or itching.    CARDIOVASCULAR:  No chest pain, chest pressure or chest discomfort. No palpitations or edema.    RESPIRATORY:  No shortness of breath, cough or sputum.    GASTROINTESTINAL:  SEE HPI    GENITOURINARY:  No dysuria, hematuria, urinary frequency    NEUROLOGICAL:  No headache, dizziness, syncope, paralysis, ataxia, numbness or tingling in the extremities. No change in bowel or bladder control.    MUSCULOSKELETAL:  No muscle, back pain, joint pain or stiffness.    HEMATOLOGIC:  No anemia, bleeding or bruising.    LYMPHATICS:  No enlarged nodes. No history of splenectomy.    PSYCHIATRIC:  No history of depression or anxiety.    ENDOCRINOLOGIC:  No reports of sweating, cold or heat intolerance. No polyuria or polydipsia.    ALLERGIES:  No history of asthma, hives, eczema or rhinitis.  ______________________________________________________________________  PHYSICAL EXAM:  T(C): 36.8 (07-28-23 @ 04:47), Max: 36.8 (07-28-23 @ 04:47)  HR: 71 (07-28-23 @ 04:47)  BP: 161/71 (07-28-23 @ 04:47)  RR: 18 (07-28-23 @ 04:47)  SpO2: 96% (07-28-23 @ 04:47)  Wt(kg): --      GEN: NAD   CVS- S1 S2  ABD: soft nontender, non distended, bowel sounds+  LUNGS: clear to auscultation  NEURO: noin focal neuro exam; AAO x 3  Extremities: no cyanosis, no calf tenderness, no edema, no clubbing      ______________________________________________________________________  LABS:                        10.6   6.71  )-----------( 161      ( 28 Jul 2023 07:55 )             32.1     07-28    144  |  113<H>  |  12  ----------------------------<  88  4.0   |  25  |  1.19    Ca    7.7<L>      28 Jul 2023 07:55  Phos  2.0     07-28  Mg     2.0     07-28    TPro  6.5  /  Alb  3.1<L>  /  TBili  0.3  /  DBili  x   /  AST  11  /  ALT  15  /  AlkPhos  53  07-27    LIVER FUNCTIONS - ( 27 Jul 2023 11:20 )  Alb: 3.1 g/dL / Pro: 6.5 g/dL / ALK PHOS: 53 U/L / ALT: 15 U/L DA / AST: 11 U/L / GGT: x           ______________________________________________________________________  IMAGING:    ______________________________________________________________________

## 2023-07-28 NOTE — DISCHARGE NOTE NURSING/CASE MANAGEMENT/SOCIAL WORK - NSDCPEFALRISK_GEN_ALL_CORE
For information on Fall & Injury Prevention, visit: [email or web address] OR  [email or web address] OR  https://www.cdc.gov/steadi/patient.html

## 2023-07-28 NOTE — PROGRESS NOTE ADULT - ASSESSMENT
Patient is a 77 year old male, from home with PMH of HTN and HLD presented to the ED with bright red blood per rectum and passing out. Presenting with multiple episodes of hematochezia at home with syncopal episode. Hb 10, tachycardic intially however now VSS after 2U PRBCs. Likely post EMR bleed given recent large ileocecal polyp removal.  In the ED a code fusion was called on patient.  patient had 2U PRBC transfused.  patients pre transfustion Hb was 10.3.  Patient will require urgent Colonoscopy per GI Dr. Pérez.  Patient is being admitted to medicine for acute Lower GI Bleed      7/28: S/P colonoscopy revealed Mucosa The prior EMR site was visualized at the ileocecal valve extending into the terminal ileum. The site was ulcerated with areas of friable mucosal contact-oozing and hematin spots. There was one small suspicious-appearing vessel seen on the terminal ileal side. For hemostasis, one hemostatic clip (MR conditional) was placed over the vessel. The clip was in good position. Nexpowder was then applied to the entire resection bed to treat the friable mucosal oozing with good effect. There was no bleeding at conclusion. Protruding lesions Medium internal and external hemorrhoids were noted.  HH trending up. No BMs since colonoscopy. Tolerating clears     #BRBRPR/hematochezia  #Symptomatic anemia  #Acute GIB  s/p 2 units PRBC in ED. Hgb 10.3.   -Advance diet  -Monitor stool count   -Trend CBC  -If patient is tolerating diet and no BM today be be discharged But is patient is willing to stay overnight for a repeat CBC in the Am that is fine as well.    Patient is a 77 year old male, from home with PMH of HTN and HLD presented to the ED with bright red blood per rectum and passing out. Presenting with multiple episodes of hematochezia at home with syncopal episode. Hb 10, tachycardic intially however now VSS after 2U PRBCs. Likely post EMR bleed given recent large ileocecal polyp removal.  In the ED a code fusion was called on patient.  patient had 2U PRBC transfused.  patients pre transfustion Hb was 10.3.  Patient will require urgent Colonoscopy per GI Dr. Pérez.  Patient is being admitted to medicine for acute Lower GI Bleed      7/28: S/P colonoscopy revealed Mucosa The prior EMR site was visualized at the ileocecal valve extending into the terminal ileum. The site was ulcerated with areas of friable mucosal contact-oozing and hematin spots. There was one small suspicious-appearing vessel seen on the terminal ileal side. For hemostasis, one hemostatic clip (MR conditional) was placed over the vessel. The clip was in good position. Nexpowder was then applied to the entire resection bed to treat the friable mucosal oozing with good effect. There was no bleeding at conclusion. Protruding lesions Medium internal and external hemorrhoids were noted.  HH trending up. No BMs since colonoscopy. Tolerating clears     #BRBRPR/hematochezia  #Symptomatic anemia  #Acute GIB  s/p 2 units PRBC in ED. Hgb 10.3.   -Advance diet  -Monitor stool count   -Trend CBC

## 2023-11-09 ENCOUNTER — NON-APPOINTMENT (OUTPATIENT)
Age: 78
End: 2023-11-09

## 2023-11-13 ENCOUNTER — TRANSCRIPTION ENCOUNTER (OUTPATIENT)
Age: 78
End: 2023-11-13

## 2023-11-13 ENCOUNTER — INPATIENT (INPATIENT)
Facility: HOSPITAL | Age: 78
LOS: 0 days | Discharge: HOME | DRG: 322 | End: 2023-11-14
Attending: INTERNAL MEDICINE | Admitting: INTERNAL MEDICINE
Payer: COMMERCIAL

## 2023-11-13 VITALS
TEMPERATURE: 98 F | DIASTOLIC BLOOD PRESSURE: 68 MMHG | HEIGHT: 67 IN | OXYGEN SATURATION: 97 % | WEIGHT: 156.97 LBS | HEART RATE: 62 BPM | RESPIRATION RATE: 17 BRPM | SYSTOLIC BLOOD PRESSURE: 150 MMHG

## 2023-11-13 DIAGNOSIS — Z98.890 OTHER SPECIFIED POSTPROCEDURAL STATES: Chronic | ICD-10-CM

## 2023-11-13 DIAGNOSIS — R94.39 ABNORMAL RESULT OF OTHER CARDIOVASCULAR FUNCTION STUDY: ICD-10-CM

## 2023-11-13 LAB
ANION GAP SERPL CALC-SCNC: 12 MMOL/L — SIGNIFICANT CHANGE UP (ref 5–17)
ANION GAP SERPL CALC-SCNC: 12 MMOL/L — SIGNIFICANT CHANGE UP (ref 5–17)
BUN SERPL-MCNC: 27 MG/DL — HIGH (ref 7–23)
BUN SERPL-MCNC: 27 MG/DL — HIGH (ref 7–23)
CALCIUM SERPL-MCNC: 9.6 MG/DL — SIGNIFICANT CHANGE UP (ref 8.4–10.5)
CALCIUM SERPL-MCNC: 9.6 MG/DL — SIGNIFICANT CHANGE UP (ref 8.4–10.5)
CHLORIDE SERPL-SCNC: 106 MMOL/L — SIGNIFICANT CHANGE UP (ref 96–108)
CHLORIDE SERPL-SCNC: 106 MMOL/L — SIGNIFICANT CHANGE UP (ref 96–108)
CO2 SERPL-SCNC: 26 MMOL/L — SIGNIFICANT CHANGE UP (ref 22–31)
CO2 SERPL-SCNC: 26 MMOL/L — SIGNIFICANT CHANGE UP (ref 22–31)
CREAT SERPL-MCNC: 1.52 MG/DL — HIGH (ref 0.5–1.3)
CREAT SERPL-MCNC: 1.52 MG/DL — HIGH (ref 0.5–1.3)
EGFR: 46 ML/MIN/1.73M2 — LOW
EGFR: 46 ML/MIN/1.73M2 — LOW
GLUCOSE SERPL-MCNC: 111 MG/DL — HIGH (ref 70–99)
GLUCOSE SERPL-MCNC: 111 MG/DL — HIGH (ref 70–99)
HCT VFR BLD CALC: 39.3 % — SIGNIFICANT CHANGE UP (ref 39–50)
HCT VFR BLD CALC: 39.3 % — SIGNIFICANT CHANGE UP (ref 39–50)
HGB BLD-MCNC: 12.5 G/DL — LOW (ref 13–17)
HGB BLD-MCNC: 12.5 G/DL — LOW (ref 13–17)
MCHC RBC-ENTMCNC: 29.1 PG — SIGNIFICANT CHANGE UP (ref 27–34)
MCHC RBC-ENTMCNC: 29.1 PG — SIGNIFICANT CHANGE UP (ref 27–34)
MCHC RBC-ENTMCNC: 31.8 GM/DL — LOW (ref 32–36)
MCHC RBC-ENTMCNC: 31.8 GM/DL — LOW (ref 32–36)
MCV RBC AUTO: 91.4 FL — SIGNIFICANT CHANGE UP (ref 80–100)
MCV RBC AUTO: 91.4 FL — SIGNIFICANT CHANGE UP (ref 80–100)
NRBC # BLD: 0 /100 WBCS — SIGNIFICANT CHANGE UP (ref 0–0)
NRBC # BLD: 0 /100 WBCS — SIGNIFICANT CHANGE UP (ref 0–0)
PLATELET # BLD AUTO: 213 K/UL — SIGNIFICANT CHANGE UP (ref 150–400)
PLATELET # BLD AUTO: 213 K/UL — SIGNIFICANT CHANGE UP (ref 150–400)
POTASSIUM SERPL-MCNC: 5.1 MMOL/L — SIGNIFICANT CHANGE UP (ref 3.5–5.3)
POTASSIUM SERPL-MCNC: 5.1 MMOL/L — SIGNIFICANT CHANGE UP (ref 3.5–5.3)
POTASSIUM SERPL-SCNC: 5.1 MMOL/L — SIGNIFICANT CHANGE UP (ref 3.5–5.3)
POTASSIUM SERPL-SCNC: 5.1 MMOL/L — SIGNIFICANT CHANGE UP (ref 3.5–5.3)
RBC # BLD: 4.3 M/UL — SIGNIFICANT CHANGE UP (ref 4.2–5.8)
RBC # BLD: 4.3 M/UL — SIGNIFICANT CHANGE UP (ref 4.2–5.8)
RBC # FLD: 14.5 % — SIGNIFICANT CHANGE UP (ref 10.3–14.5)
RBC # FLD: 14.5 % — SIGNIFICANT CHANGE UP (ref 10.3–14.5)
SODIUM SERPL-SCNC: 144 MMOL/L — SIGNIFICANT CHANGE UP (ref 135–145)
SODIUM SERPL-SCNC: 144 MMOL/L — SIGNIFICANT CHANGE UP (ref 135–145)
WBC # BLD: 5.96 K/UL — SIGNIFICANT CHANGE UP (ref 3.8–10.5)
WBC # BLD: 5.96 K/UL — SIGNIFICANT CHANGE UP (ref 3.8–10.5)
WBC # FLD AUTO: 5.96 K/UL — SIGNIFICANT CHANGE UP (ref 3.8–10.5)
WBC # FLD AUTO: 5.96 K/UL — SIGNIFICANT CHANGE UP (ref 3.8–10.5)

## 2023-11-13 PROCEDURE — 92978 ENDOLUMINL IVUS OCT C 1ST: CPT | Mod: 26,LD

## 2023-11-13 PROCEDURE — 99152 MOD SED SAME PHYS/QHP 5/>YRS: CPT

## 2023-11-13 PROCEDURE — 92928 PRQ TCAT PLMT NTRAC ST 1 LES: CPT | Mod: LD

## 2023-11-13 PROCEDURE — 93010 ELECTROCARDIOGRAM REPORT: CPT

## 2023-11-13 PROCEDURE — 93454 CORONARY ARTERY ANGIO S&I: CPT | Mod: 26,59

## 2023-11-13 RX ORDER — ASPIRIN/CALCIUM CARB/MAGNESIUM 324 MG
1 TABLET ORAL
Qty: 90 | Refills: 3
Start: 2023-11-13 | End: 2024-11-06

## 2023-11-13 RX ORDER — ASPIRIN/CALCIUM CARB/MAGNESIUM 324 MG
1 TABLET ORAL
Refills: 0 | DISCHARGE

## 2023-11-13 RX ORDER — CLOPIDOGREL BISULFATE 75 MG/1
1 TABLET, FILM COATED ORAL
Qty: 90 | Refills: 3
Start: 2023-11-13 | End: 2024-11-06

## 2023-11-13 RX ORDER — SODIUM CHLORIDE 9 MG/ML
250 INJECTION INTRAMUSCULAR; INTRAVENOUS; SUBCUTANEOUS ONCE
Refills: 0 | Status: COMPLETED | OUTPATIENT
Start: 2023-11-13 | End: 2023-11-13

## 2023-11-13 RX ORDER — FEBUXOSTAT 40 MG/1
1 TABLET ORAL
Refills: 0 | DISCHARGE

## 2023-11-13 RX ORDER — ERGOCALCIFEROL 1.25 MG/1
1 CAPSULE ORAL
Refills: 0 | DISCHARGE

## 2023-11-13 RX ORDER — ATORVASTATIN CALCIUM 80 MG/1
40 TABLET, FILM COATED ORAL AT BEDTIME
Refills: 0 | Status: DISCONTINUED | OUTPATIENT
Start: 2023-11-13 | End: 2023-11-13

## 2023-11-13 RX ORDER — DOXAZOSIN MESYLATE 4 MG
4 TABLET ORAL AT BEDTIME
Refills: 0 | Status: DISCONTINUED | OUTPATIENT
Start: 2023-11-13 | End: 2023-11-14

## 2023-11-13 RX ORDER — ATENOLOL 25 MG/1
12.5 TABLET ORAL DAILY
Refills: 0 | Status: DISCONTINUED | OUTPATIENT
Start: 2023-11-13 | End: 2023-11-14

## 2023-11-13 RX ORDER — TERAZOSIN HYDROCHLORIDE 10 MG/1
1 CAPSULE ORAL
Qty: 0 | Refills: 0 | DISCHARGE

## 2023-11-13 RX ORDER — SODIUM CHLORIDE 9 MG/ML
1000 INJECTION INTRAMUSCULAR; INTRAVENOUS; SUBCUTANEOUS
Refills: 0 | Status: DISCONTINUED | OUTPATIENT
Start: 2023-11-13 | End: 2023-11-14

## 2023-11-13 RX ORDER — ASPIRIN/CALCIUM CARB/MAGNESIUM 324 MG
81 TABLET ORAL DAILY
Refills: 0 | Status: DISCONTINUED | OUTPATIENT
Start: 2023-11-13 | End: 2023-11-14

## 2023-11-13 RX ORDER — ATENOLOL 25 MG/1
0.5 TABLET ORAL
Refills: 0 | DISCHARGE

## 2023-11-13 RX ORDER — CLOPIDOGREL BISULFATE 75 MG/1
75 TABLET, FILM COATED ORAL DAILY
Refills: 0 | Status: DISCONTINUED | OUTPATIENT
Start: 2023-11-14 | End: 2023-11-14

## 2023-11-13 RX ADMIN — SODIUM CHLORIDE 75 MILLILITER(S): 9 INJECTION INTRAMUSCULAR; INTRAVENOUS; SUBCUTANEOUS at 14:52

## 2023-11-13 RX ADMIN — SODIUM CHLORIDE 750 MILLILITER(S): 9 INJECTION INTRAMUSCULAR; INTRAVENOUS; SUBCUTANEOUS at 14:53

## 2023-11-13 RX ADMIN — SODIUM CHLORIDE 100 MILLILITER(S): 9 INJECTION INTRAMUSCULAR; INTRAVENOUS; SUBCUTANEOUS at 17:30

## 2023-11-13 RX ADMIN — ATENOLOL 12.5 MILLIGRAM(S): 25 TABLET ORAL at 20:48

## 2023-11-13 NOTE — DISCHARGE NOTE PROVIDER - CARE PROVIDERS DIRECT ADDRESSES
,[email or web address]

## 2023-11-13 NOTE — ASU PATIENT PROFILE, ADULT - PAIN SCALE PREFERRED, PROFILE
numerical 0-10 Epidermal Autograft Text: The defect edges were debeveled with a #15 scalpel blade.  Given the location of the defect, shape of the defect and the proximity to free margins an epidermal autograft was deemed most appropriate.  Using a sterile surgical marker, the primary defect shape was transferred to the donor site. The epidermal graft was then harvested.  The skin graft was then placed in the primary defect and oriented appropriately.

## 2023-11-13 NOTE — PATIENT PROFILE ADULT - FALL HARM RISK - HARM RISK INTERVENTIONS

## 2023-11-13 NOTE — H&P CARDIOLOGY - GASTROINTESTINAL
SBAR Transfer Handoff Report    Situation-  Josh Ontiveros, 38year old, 1986 was admitted to hospital on 5/12/2022 and in the ICU :  3d 1h being treated for alcohol withdrawal  Â· The patient has met ICU discharge criteria, and transfer orders have been received. The patient will transfer to room #517. Background-  Â· Code status: Full Resuscitation at this time  Â· Vitals signs trends & at the time of report / Oxygen level   Temp: 98.6 Â°F (37 Â°C)  Heart Rate: 94  BP: 127/78  Resp: 18  SpO2: 99 %  â¢ Items or findings that require follow-up include: Not applicable       Assessment-  Â· Overall, Mariluz Sanchez is improving. Â· Diet: Regular Diet   Â· Telemetry monitoring will be continuedbased on orders and patient condition. Last reviewed rhythm interpreted and mounted was Normal Sinus Rhythm  Â· Isolation: Contact with Special Precautions  Â· Activity/mobility requirements: Ambulated, Head of bed elevation, Extremity elevation/immobilization, Range of motion      Â· Pain level/approaches to pain(last documented):   Numeric Rating Scale 0-10: 0    Â· See flow sheet for physical assessment details    Recommendation-  Â· Patient's Belongings have been reviewed AND will transfer with patient: Yes   Â· Patient Belongings with patient at transfer Clothing, Cell phone (back pack and large luggage bag)  Â· Tele Hub was notified of transfer: Yes  Â· Patient requests wife be notified when transfer is completed  Â· Receiving RN to review orders upon admission.     Device:     peripheral IV Necessity addressed with Provider Yes    Hygiene performed: CHG Bath, With assistance, Perineal care    Notes:    SBAR handoff was reported/reviewed with:Ok WRIGHT RN Soft, non-tender, no hepatosplenomegaly, normal bowel sounds

## 2023-11-13 NOTE — DISCHARGE NOTE PROVIDER - CARE PROVIDER_API CALL
Dorothy Agarwal  Cardiovascular Disease  [address] [facility], [address] [place] [id number]  Phone: [phone]  Fax: [phone]  Established Patient  Follow Up Time: 2 weeks

## 2023-11-13 NOTE — DISCHARGE NOTE PROVIDER - NSDCCPCAREPLAN_GEN_ALL_CORE_FT
PRINCIPAL DISCHARGE DIAGNOSIS  Diagnosis: CAD (coronary artery disease)  Assessment and Plan of Treatment: You have a diagnosis of coronary artery disease and underwent a cardiac catheterization where you received a stent to your left anterior descending coronary artery. You have been started on Aspirin 81mg daily, Plavix 75mg daily. Continue taking all medications as prescribed.   The procedure was done through the wrist. Please avoid any heavy lifting (no more than 3 to 5 lbs), strenuous activity, bending, straining, or unnecessary stair climbing for 2 weeks. No driving for 2 days. You may shower 24 hours following the procedure but avoid baths/swimming for 1 week. If you develop any swelling, bleeding, hardening of the skin (hematoma formation), acute pain, numbness/tingling in your arm or have any questions/concerns regarding your procedure, please call [facility] [phone]  NEVER miss a dose of Aspirin and Plavix to ensure your stent does not close. DO NOT STOP THESE MEDICATIONS FOR ANY REASON UNLESS OTHERWISE INDICATED BY YOUR CARDIOLOGIST BECAUSE THIS WILL PUT YOU AT RISK OF YOUR STENT CLOSING AND HAVING A HEART ATTACK OR SUDDEN SEVERE LEG PAIN DUE TO BLOCKAGE OF BLOOD FLOW TO LEG.  You have been given a Stent Card to carry with you in your wallet.  Make Photocopies or take a picture of card so you have a backup copy.  This card has important information for any possible future Radiology/MRI studies.    Please make a follow up appointment with your cardiologist within 1-2 weeks of your discharge. All of your prescriptions have been sent electronically to your pharmacy.      SECONDARY DISCHARGE DIAGNOSES  Diagnosis: HTN (hypertension)  Assessment and Plan of Treatment: You have high blood pressure. Continue taking your blood pressure medications as prescribed. Eat a heart healthy diet with low salt; exercise regularly (if cleared by your primary care doctor or cardiologist). Maintain a heart healthy weight and include healthy ways to manage stress. If you smoke, quit. If you need assistance to help you stop smoking, please use the following resource: [facility] – ([phone]). Follow up with your primary care doctor to continue having your blood pressure checked on a continual basis.    Diagnosis: HLD (hyperlipidemia)  Assessment and Plan of Treatment: LDL<70   Goal is to keep LDL<70. Continue with your cholesterol medications as prescribed. Eat a heart healthy diet that is low in saturated fats and salt, and includes whole grains, fruits, vegetables and lean protein; exercise regularly (consult with your physician or cardiologist first); maintain a heart healthy weight; if you smoke - quit (A resource to help you stop smoking is the [facility] – phone number [phone].). Continue to follow with your primary physician or cardiologist.

## 2023-11-13 NOTE — DISCHARGE NOTE PROVIDER - HOSPITAL COURSE
HPI:  Patient is a 77 year old [place] Islander speaking male (pt requesting daughter for translation [place] at bedside)  , from home with no known implantable devices noted with PMH of HTN, HLD, Renal insufficiency , and BPH. Pt with recent complaints of chest pain upon exertion states "its burning hot" over last several months. Pt seen by Cardiologist Dr. Dorothy Agarwal and had abnormal stress test poor augmentation of anterior wall. Pt underwent a stress echo on 10/25/23 . Now referred for C today with Dr. Gurrola . Currently CP free no sob no palpitations noted.     ***pt takes Corvalol OTC 30gtts TID has been taking over last 7 days ********     (13 Nov 2023 13:01)      Now s/p cardiac cath 11/13: SONI x1 LAD via RRA    Patient seen and examined at the bedside on 11/14/23. No significant events on telemetry overnight. AM labs wnl, VSS/HD stable. Right wrist site stable; no hematoma or bruit. Denies any complaints at this time. Patient has been medically cleared for discharge as per Dr. Gurrola. Patient has been given appropriate discharge instructions including medication regimen, access site management and follow up. Medications that patient needs refills on (+/- new medications) have been e-prescribed to preferred pharmacy. Patient will f/u with Dr. Agarwal in 1-2 weeks for further management.     VSS  Gen: NAD, A&O x3  Cards: RRR, clear S1 and S2 without murmur  Pulm: CTA B/L without w/r/r  Vascular: Right wrist w/o hematoma, ooze or bruit. Peripheral pulses 2+ B/L  Abd: soft, NT  Ext: no LE edema or ulcerations B/L

## 2023-11-13 NOTE — DISCHARGE NOTE PROVIDER - NSDCFUADDINST_GEN_ALL_CORE_FT
Wound Care:   the day AFTER your procedure remove bandage GENTLY, and clean using  mild soap and gentle warm, water stream, pat dry. leave OPEN to air. YOU MAY SHOWER   DO NOT apply lotions, creams, ointments, powder, perfumes to your incision site  DO NOT SOAK your site for 1 week ( no baths, no pools, no tubs, etc...)  Check  your groin and/or wrist daily. A small amount of bruising, and soreness are normal    ACTIVITY: for 24 hours   - DO NOT DRIVE  - DO NOT make any important decisions or sign legal documents   - DO NOT operate heavy machineries   - you may resume sexual activity in 48 hours, unless otherwise instructed by your cardiologist     If your procedure was done through the WRIST: for the NEXT 3 DAYS  - avoid pushing, pulling, with that affected wrist   - avoid repeated movement of that hand and wrist ( eg: typing, hammering)  - DO NOT LIFT anything more than 5 lbs     If your procedure was done through the GROIN: for the NEXT 5 DAYS  - Limit climbing stairs, DO NOT soak in bathtub or pool  - no strenuous activities, pushing, pulling, straining  - Do not lift anything 10lbs or heavier     MEDICATION:   take your medications as explained ( see discharge paperwork)   If you received a STENT, you will be taking antiplatelet medications to KEEP YOUR STENT OPEN ( eg: Aspirin, Plavix, Brilinta, Effient, etc).  Take as prescribed DO NOT STOP taking them without consulting with your cardiologist first.     Follow heart healthy diet recommended by your doctor, , if you smoke STOP SMOKING ( may call [phone] for center of tobacco control if you need assistance)     CALL your doctor to make appointment in 2 WEEKS     ***CALL YOUR DOCTOR***  if you experience: fever, chills, body aches, or severe pain, swelling, redness, heat or yellow discharge at incision site  If you experience Bleeding or excruciating pain at the procedural site, swelling (golf ball size) at your procedural site  If you experience CHEST PAIN  If you experience extremity numbness, tingling, temperature change (of your procedural site)   If you are unable to reach your doctor, you may contact:   -Cardiology Office at [facility] at [phone] or   - [facility] [phone]  - [facility] [phone]

## 2023-11-13 NOTE — H&P CARDIOLOGY - PRESSURE ULCER(S)
Counseling and Referral of Other Preventative  (Italic type indicates deductible and co-insurance are waived)    Patient Name: Demario Live  Today's Date: 4/17/2023    Health Maintenance       Date Due Completion Date    Diabetes Urine Screening Never done ---    Eye Exam Never done ---    Colorectal Cancer Screening Never done ---    Influenza Vaccine (1) Never done ---    TETANUS VACCINE 05/31/2023 (Originally [birth year]16/1973) ---    Shingles Vaccine (1 of 2) 05/31/2023 (Originally 12/16/1973) ---    COVID-19 Vaccine (1) 05/31/2023 (Originally 6/16/1956) ---    Lipid Panel 05/31/2023 5/31/2022    Hemoglobin A1c 07/30/2023 1/30/2023    Foot Exam 03/22/2024 3/22/2023    Override on 3/22/2023: Done    Low Dose Statin 04/03/2024 4/3/2023        No orders of the defined types were placed in this encounter.      The following information is provided to all patients.  This information is to help you find resources for any of the problems found today that may be affecting your health:                Living healthy guide: www.[facility].[place]      Understanding Diabetes: www.diabetes.org      Eating healthy: www.cdc.gov/healthyweight      CDC home safety checklist: www.cdc.gov/steadi/patient.html      Agency on Aging: www.goea.[place].[facility]      Alcoholics anonymous (AA): www.aa.org      Physical Activity: www.miguel.nih.gov/cn6igpq      Tobacco use: www.quitwithusla.org     Counseling and Referral of Other Preventative  (Italic type indicates deductible and co-insurance are waived)    Patient Name: Demario Live  Today's Date: 4/17/2023    Health Maintenance       Date Due Completion Date    Diabetes Urine Screening Never done ---    Eye Exam Never done ---    Colorectal Cancer Screening Never done ---    Influenza Vaccine (1) Never done ---    TETANUS VACCINE 05/31/2023 (Originally 12/16/1972) ---    Shingles Vaccine (1 of 2) 05/31/2023 (Originally 12/16/1973) ---    COVID-19 Vaccine (1) 05/31/2023 (Originally 6/16/1956) ---     Lipid Panel 05/31/2023 5/31/2022    Hemoglobin A1c 07/30/2023 1/30/2023    Foot Exam 03/22/2024 3/22/2023    Override on 3/22/2023: Done    Low Dose Statin 04/03/2024 4/3/2023        No orders of the defined types were placed in this encounter.      The following information is provided to all patients.  This information is to help you find resources for any of the problems found today that may be affecting your health:                Living healthy guide: www.[facility].[place]      Understanding Diabetes: www.diabetes.org      Eating healthy: www.cdc.gov/healthyweight      CDC home safety checklist: www.cdc.gov/steadi/patient.html      Agency on Aging: www.goea.[place]      Alcoholics anonymous (AA): www.aa.org      Physical Activity: www.miguel.nih.gov/et3azla      Tobacco use: www.quitwithusla.org      no

## 2023-11-13 NOTE — DISCHARGE NOTE PROVIDER - NSDCCPTREATMENT_GEN_ALL_CORE_FT
PRINCIPAL PROCEDURE  Procedure: Left heart cardiac catheterization  Findings and Treatment: 11/13: SONI x1 LAD via RRA

## 2023-11-13 NOTE — DISCHARGE NOTE PROVIDER - NSDCMRMEDTOKEN_GEN_ALL_CORE_FT
aspirin 81 mg oral tablet: 1 tab(s) orally once a day  atenolol 25 mg oral tablet: 0.5 tab(s) orally once a day  clopidogrel 75 mg oral tablet: 1 tab(s) orally once a day  enalapril 5 mg oral tablet: 1 tab(s) orally once a day  ergocalciferol 1.25 mg (50,000 intl units) oral tablet: 1 tab(s) orally once a week  terazosin 5 mg oral capsule: 1 cap(s) orally once a day (at bedtime)  Uloric 40 mg oral tablet: 1 tab(s) orally once a day   amLODIPine 10 mg oral tablet: 1 tab(s) orally once a day This is a home medication  aspirin 81 mg oral tablet: 1 tab(s) orally once a day  atenolol 25 mg oral tablet: 0.5 tab(s) orally once a day  clopidogrel 75 mg oral tablet: 1 tab(s) orally once a day  enalapril 5 mg oral tablet: 1 tab(s) orally once a day  ergocalciferol 1.25 mg (50,000 intl units) oral tablet: 1 tab(s) orally once a week  terazosin 5 mg oral capsule: 1 cap(s) orally once a day (at bedtime)  Uloric 40 mg oral tablet: 1 tab(s) orally once a day

## 2023-11-13 NOTE — H&P CARDIOLOGY - HISTORY OF PRESENT ILLNESS
Patient is a 77 year old Kyrgyz speaking male , from home with no known implantable devices noted with PMH of HTN, HLD, and BPH. Pt with recent complaints of chest pain upon exertion states "its burning hot" over last several months. Pt seen by Cardiologist Dr. Dorothy Agarwal and had abnormal stress test poor augmentation of anterior wall. Pt underwent a stress echo on 10/25/23 . Now referred for [facility] today with Dr. Gurrola . Currently CP free no sob no palpitations noted.   < from: Transthoracic Echocardiogram (07.28.15 @ 08:06) >  Conclusions:  1. Normal mitral valve. Trace mitral regurgitation.  2. Normal trileaflet aortic valve. No aortic valve  regurgitation seen.  3. Normal aortic root.  4. Normal left atrium.  5. Increased relative wall thickness with normal left  ventricular (LV) mass index, consistent with concentric LV  remodeling.  6. Normal Left Ventricular Systolic Function,  (EF = 55 to  60%)  7. Normal right ventricular size and function.  8. RV systolic pressure is normal.  9. There is mild tricuspid regurgitation.    *** No previous Echo exam.    ------------------------------------------------------------------------    Confirmed on  7/28/2015 - 16:27:14 by Dorothy Agarwal MD  [facility]  ------------------------------------------------------------------------    < end of copied text >   Patient is a 77 year old Citizen of the [place] speaking male (pt requesting daughter for translation [place] at bedside)  , from home with no known implantable devices noted with PMH of HTN, HLD, Renal insufficiency , and BPH. Pt with recent complaints of chest pain upon exertion states "its burning hot" over last several months. Pt seen by Cardiologist Dr. Dorothy Agarwal and had abnormal stress test poor augmentation of anterior wall. Pt underwent a stress echo on 10/25/23 . Now referred for [facility] today with Dr. Gurrola . Currently CP free no sob no palpitations noted.   < from: Transthoracic Echocardiogram (07.28.15 @ 08:06) >  Conclusions:  1. Normal mitral valve. Trace mitral regurgitation.  2. Normal trileaflet aortic valve. No aortic valve  regurgitation seen.  3. Normal aortic root.  4. Normal left atrium.  5. Increased relative wall thickness with normal left  ventricular (LV) mass index, consistent with concentric LV  remodeling.  6. Normal Left Ventricular Systolic Function,  (EF = 55 to  60%)  7. Normal right ventricular size and function.  8. RV systolic pressure is normal.  9. There is mild tricuspid regurgitation.    *** No previous Echo exam.    ------------------------------------------------------------------------    Confirmed on  7/28/2015 - 16:27:14 by Dorothy Agarwal MD  [facility]  ------------------------------------------------------------------------    < end of copied text >   Patient is a 77 year old Chilean speaking male (pt requesting daughter for translation [place] at bedside)  , from home with no known implantable devices noted with PMH of HTN, HLD, Renal insufficiency , and BPH. Pt with recent complaints of chest pain upon exertion states "its burning hot" over last several months. Pt seen by Cardiologist Dr. Dorothy Agarwal and had abnormal stress test poor augmentation of anterior wall. Pt underwent a stress echo on 10/25/23 . Now referred for [facility] today with Dr. Gurrola . Currently CP free no sob no palpitations noted.     ***pt takes Corvalol OTC 30gtts TID has been taking over last 7 days ********      < from: Transthoracic Echocardiogram (07.28.15 @ 08:06) >  Conclusions:  1. Normal mitral valve. Trace mitral regurgitation.  2. Normal trileaflet aortic valve. No aortic valve  regurgitation seen.  3. Normal aortic root.  4. Normal left atrium.  5. Increased relative wall thickness with normal left  ventricular (LV) mass index, consistent with concentric LV  remodeling.  6. Normal Left Ventricular Systolic Function,  (EF = 55 to  60%)  7. Normal right ventricular size and function.  8. RV systolic pressure is normal.  9. There is mild tricuspid regurgitation.    *** No previous Echo exam.    ------------------------------------------------------------------------    Confirmed on  7/28/2015 - 16:27:14 by Dorothy Agarwal MD  [facility]  ------------------------------------------------------------------------    < end of copied text >

## 2023-11-14 ENCOUNTER — NON-APPOINTMENT (OUTPATIENT)
Age: 78
End: 2023-11-14

## 2023-11-14 VITALS
DIASTOLIC BLOOD PRESSURE: 70 MMHG | HEART RATE: 75 BPM | SYSTOLIC BLOOD PRESSURE: 165 MMHG | OXYGEN SATURATION: 98 % | RESPIRATION RATE: 18 BRPM | TEMPERATURE: 97 F

## 2023-11-14 LAB
ANION GAP SERPL CALC-SCNC: 10 MMOL/L — SIGNIFICANT CHANGE UP (ref 5–17)
ANION GAP SERPL CALC-SCNC: 10 MMOL/L — SIGNIFICANT CHANGE UP (ref 5–17)
BUN SERPL-MCNC: 26 MG/DL — HIGH (ref 7–23)
BUN SERPL-MCNC: 26 MG/DL — HIGH (ref 7–23)
CALCIUM SERPL-MCNC: 8.8 MG/DL — SIGNIFICANT CHANGE UP (ref 8.4–10.5)
CALCIUM SERPL-MCNC: 8.8 MG/DL — SIGNIFICANT CHANGE UP (ref 8.4–10.5)
CHLORIDE SERPL-SCNC: 108 MMOL/L — SIGNIFICANT CHANGE UP (ref 96–108)
CHLORIDE SERPL-SCNC: 108 MMOL/L — SIGNIFICANT CHANGE UP (ref 96–108)
CO2 SERPL-SCNC: 23 MMOL/L — SIGNIFICANT CHANGE UP (ref 22–31)
CO2 SERPL-SCNC: 23 MMOL/L — SIGNIFICANT CHANGE UP (ref 22–31)
CREAT SERPL-MCNC: 1.38 MG/DL — HIGH (ref 0.5–1.3)
CREAT SERPL-MCNC: 1.38 MG/DL — HIGH (ref 0.5–1.3)
EGFR: 52 ML/MIN/1.73M2 — LOW
EGFR: 52 ML/MIN/1.73M2 — LOW
GLUCOSE SERPL-MCNC: 86 MG/DL — SIGNIFICANT CHANGE UP (ref 70–99)
GLUCOSE SERPL-MCNC: 86 MG/DL — SIGNIFICANT CHANGE UP (ref 70–99)
POTASSIUM SERPL-MCNC: 4.3 MMOL/L — SIGNIFICANT CHANGE UP (ref 3.5–5.3)
POTASSIUM SERPL-MCNC: 4.3 MMOL/L — SIGNIFICANT CHANGE UP (ref 3.5–5.3)
POTASSIUM SERPL-SCNC: 4.3 MMOL/L — SIGNIFICANT CHANGE UP (ref 3.5–5.3)
POTASSIUM SERPL-SCNC: 4.3 MMOL/L — SIGNIFICANT CHANGE UP (ref 3.5–5.3)
SODIUM SERPL-SCNC: 141 MMOL/L — SIGNIFICANT CHANGE UP (ref 135–145)
SODIUM SERPL-SCNC: 141 MMOL/L — SIGNIFICANT CHANGE UP (ref 135–145)

## 2023-11-14 PROCEDURE — C9600: CPT | Mod: LD

## 2023-11-14 PROCEDURE — 99232 SBSQ HOSP IP/OBS MODERATE 35: CPT

## 2023-11-14 PROCEDURE — C1874: CPT

## 2023-11-14 PROCEDURE — C1769: CPT

## 2023-11-14 PROCEDURE — C1753: CPT

## 2023-11-14 PROCEDURE — 36415 COLL VENOUS BLD VENIPUNCTURE: CPT

## 2023-11-14 PROCEDURE — 93454 CORONARY ARTERY ANGIO S&I: CPT | Mod: 59

## 2023-11-14 PROCEDURE — 80048 BASIC METABOLIC PNL TOTAL CA: CPT

## 2023-11-14 PROCEDURE — C1894: CPT

## 2023-11-14 PROCEDURE — 92978 ENDOLUMINL IVUS OCT C 1ST: CPT | Mod: LD

## 2023-11-14 PROCEDURE — C1725: CPT

## 2023-11-14 PROCEDURE — C1887: CPT

## 2023-11-14 PROCEDURE — 93005 ELECTROCARDIOGRAM TRACING: CPT

## 2023-11-14 PROCEDURE — 85027 COMPLETE CBC AUTOMATED: CPT

## 2023-11-14 RX ORDER — AMLODIPINE BESYLATE 2.5 MG/1
1 TABLET ORAL
Qty: 0 | Refills: 0 | DISCHARGE

## 2023-11-14 RX ORDER — PANTOPRAZOLE SODIUM 20 MG/1
40 TABLET, DELAYED RELEASE ORAL ONCE
Refills: 0 | Status: COMPLETED | OUTPATIENT
Start: 2023-11-14 | End: 2023-11-14

## 2023-11-14 RX ADMIN — Medication 81 MILLIGRAM(S): at 05:12

## 2023-11-14 RX ADMIN — CLOPIDOGREL BISULFATE 75 MILLIGRAM(S): 75 TABLET, FILM COATED ORAL at 05:12

## 2023-11-14 RX ADMIN — PANTOPRAZOLE SODIUM 40 MILLIGRAM(S): 20 TABLET, DELAYED RELEASE ORAL at 07:38

## 2023-11-14 RX ADMIN — Medication 5 MILLIGRAM(S): at 00:32

## 2023-11-14 RX ADMIN — Medication 4 MILLIGRAM(S): at 00:32

## 2023-11-14 NOTE — PROGRESS NOTE ADULT - SUBJECTIVE AND OBJECTIVE BOX
[facility] INVASIVE CARDIOLOGY -- Maria Esther Gurrola, Angie Silva, Clau, Stormy, Nolberto Sih Wilson, Johnston-Schilling  Cardiac cath lab - [facility]  [phone]     Chief complaint: Patient is a 78y old  Male who presents with a chief complaint of     HPI:  Patient is a 77 year old Bermudian speaking male (pt requesting daughter for translation Yesenia at bedside)  , from home with no known implantable devices noted with PMH of HTN, HLD, Renal insufficiency , and BPH. Pt with recent complaints of chest pain upon exertion states "its burning hot" over last several months. Pt seen by Cardiologist Dr. Dorothy Agarwal and had abnormal stress test poor augmentation of anterior wall. Pt underwent a stress echo on 10/25/23 . Now referred for LHC today with Dr. Gurrola . Currently CP free no sob no palpitations noted.     ***pt takes Corvalol OTC 30gtts TID has been taking over last 7 days ********   (13 Nov 2023 13:01)      Subjective/Observations: Patient seen and assessed at bedside. Denies changes in vision, headaches, dizziness, chest pain, palpitations, SOB, abdominal pain, N/V/D, leg pain/edema or any other complaints.       ROS Negative except as per Subjective and HPI      PAST MEDICAL & SURGICAL HISTORY:  HTN (hypertension)      History of BPH      Lipidemia      H/O colonoscopy          ALLERGIES:  statins (Muscle Pain)  nitroglycerin (Headache; Faint)  sulfa drugs (Other; Rash)      MEDICATIONS:  atenolol  Tablet 12.5 milliGRAM(s) Oral daily  doxazosin 4 milliGRAM(s) Oral at bedtime  enalapril 5 milliGRAM(s) Oral daily      aspirin enteric coated 81 milliGRAM(s) Oral daily  clopidogrel Tablet 75 milliGRAM(s) Oral daily  sodium chloride 0.9%. 1000 milliLiter(s) IV Continuous <Continuous>  sodium chloride 0.9%. 1000 milliLiter(s) IV Continuous <Continuous>      TELEMETRY:  T(C): 36.6 (11-13-23 @ 20:40), Max: 36.7 (11-13-23 @ 13:01)  HR: 65 (11-13-23 @ 23:40) (60 - 75)  BP: 157/72 (11-13-23 @ 23:40) (134/60 - 173/78)  RR: 18 (11-13-23 @ 23:40) (16 - 18)  SpO2: 97% (11-13-23 @ 23:40) (94% - 98%)  Wt(kg): --  I&O's Summary    13 Nov 2023 07:01  -  14 Nov 2023 00:12  --------------------------------------------------------  IN: 0 mL / OUT: 575 mL / NET: -575 mL      Height (cm): 170.2 (11-13 @ 13:44)  Weight (kg): 71.2 (11-13 @ 13:44)  BMI (kg/m2): 24.6 (11-13 @ 13:44)  BSA (m2): 1.82 (11-13 @ 13:44)  Joseph:  Central/PICC/Mid Line:                                         FOCUSED PHYSICAL EXAM:  Appearance: Normal  Cardiovascular: Normal S1 S2, No JVD, No murmurs, rubs, gallops or clicks  Respiratory: Lungs clear to auscultation. No Rales, Rhonchi, Wheezing	  Vascular: radial site stable w/o bleeding or hematoma, soft, + pulses     ECG:  	  RADIOLOGY:   DIAGNOSTIC TESTING:  [ ] Echocardiogram:  [ ] Catheterization:  [ ] Stress Test:    [ ] TASHI  [ ] CCTA  OTHER: 	    LABS: All labs reviewed	 	  CARDIAC MARKERS:                        12.5   5.96  )-----------( 213      ( 13 Nov 2023 13:23 )             39.3     11-13    144  |  106  |  27<H>  ----------------------------<  111<H>  5.1   |  26  |  1.52<H>    Ca    9.6      13 Nov 2023 13:23      proBNP:   Lipid Profile:   HgA1c:   TSH:

## 2023-11-14 NOTE — DISCHARGE NOTE NURSING/CASE MANAGEMENT/SOCIAL WORK - PATIENT PORTAL LINK FT
You can access the FollowMyHealth Patient Portal offered by [facility] by registering at the following website: [email or web address]. By joining [facility]’s FollowMyHealth portal, you will also be able to view your health information using other applications (apps) compatible with our system.

## 2023-11-14 NOTE — DISCHARGE NOTE NURSING/CASE MANAGEMENT/SOCIAL WORK - NSDCPEFALRISK_GEN_ALL_CORE
For information on Fall & Injury Prevention, visit: [email or web address] OR  [email or web address] OR  https://www.cdc.gov/steadi/patient.html

## 2023-11-14 NOTE — PROVIDER CONTACT NOTE (OTHER) - ASSESSMENT
LMOM.  Writer will touch base w/ pt tomorrow morning.     A&O x4.  C/o chest pressure, non-radiating, when drinking water. Pressure stays in left side of chest and is on-off. Patient states he had same chest pressure issue before hospital stay

## 2023-11-14 NOTE — CHART NOTE - NSCHARTNOTEFT_GEN_A_CORE
Pt reports left sided chest pressure at rest, after drinking water. States this occurs at home w eating  No exertional chest pain with ambulating   EKG   VVS    A+O x 4  Pulm CTA johnnie  CV S1S2 RRR  Abd soft, non tender +BS  RRA site benign with no hematoma or bleeding + radial pulse intact  Cap refill < 2 secs    Plan  EKG - no significant changes (EKG reviewed with Dr Gurrola)  Ate breakfast.   Ambulating without pain now  Cleared for discharge to home   ASA/Plavix  Follow up with Dr Agarwal in 1-2 weeks  PT instructed to discuss other cholesterol lowering options (intolerant to statins) Pt reports left sided chest pressure at rest, after drinking water. States this occurs at home w eating x past several weeks  No exertional chest pain with ambulating   VVS. No acute distress    A+O x 4  Pulm CTA johnnie  CV S1S2 RRR  Abd soft, non tender +BS  RRA site benign with no hematoma or bleeding + radial pulse intact  Cap refill < 2 secs    Plan  EKG - no significant changes (EKG reviewed with Dr Gurrola)  Ate breakfast and ambulating without pain now  Cleared for discharge to home   ASA/Plavix - patient teaching done with son present at bedside  Follow up with Dr Agarwal in 1-2 weeks  PT instructed to discuss other cholesterol lowering options with Dr Agarwal  (intolerant to statins)  Also, has GI doctor- instructed to follow up as needed

## 2023-11-14 NOTE — PROGRESS NOTE ADULT - ASSESSMENT
76 y/o Male w/ PMHx of HTN, HLD, CKD stage III, BPH presents for cardiac cath. Now s/p SONI x1 mLAD 11/13    # CAD  - Abnormal stress test outpatient  - Now s/p cardiac cath 11/13: SONI x1 mLAD (95%) via RRA  - Right radial site stable; soft and no hematoma  - Continue ASA 81mg QD, Plavix 75mg QD    # HTN  - Initially hypertensive post-recovery, now normotensive  - Continue Enalapril 5mg QD and Atenolol 12.5mg QD    # HLD  - Statin intolerant     # CKD stage III  - SCr 1.52; pt's baseline 1.8  - Monitor serum Cr daily  - Avoid NSAIDs, contrast, and potential nephrotoxic agents  - Renally dose meds    # BPH  - Continue Terazosin TIC Doxazosin 4mg QD    # PPx  - DVT ppx: none  - Diet: DASH/TLC  - Dispo: likely today    Jessica Méndez PA-C  Invasive cardiology  [phone]

## 2023-11-29 ENCOUNTER — APPOINTMENT (OUTPATIENT)
Dept: CARDIOLOGY | Facility: CLINIC | Age: 78
End: 2023-11-29
Payer: MEDICARE

## 2023-11-29 VITALS
WEIGHT: 158 LBS | HEIGHT: 67 IN | HEART RATE: 65 BPM | SYSTOLIC BLOOD PRESSURE: 152 MMHG | OXYGEN SATURATION: 96 % | DIASTOLIC BLOOD PRESSURE: 65 MMHG | BODY MASS INDEX: 24.8 KG/M2

## 2023-11-29 DIAGNOSIS — Z78.9 OTHER SPECIFIED HEALTH STATUS: ICD-10-CM

## 2023-11-29 DIAGNOSIS — I10 ESSENTIAL (PRIMARY) HYPERTENSION: ICD-10-CM

## 2023-11-29 PROCEDURE — 93000 ELECTROCARDIOGRAM COMPLETE: CPT

## 2023-11-29 PROCEDURE — 99204 OFFICE O/P NEW MOD 45 MIN: CPT

## 2023-11-29 RX ORDER — APIXABAN 5 MG/1
TABLET, FILM COATED ORAL
Refills: 0 | Status: DISCONTINUED | COMMUNITY
End: 2023-11-29

## 2023-11-29 RX ORDER — POLYETHYLENE GLYCOL-3350 AND ELECTROLYTES WITH FLAVOR PACK 240; 5.84; 2.98; 6.72; 22.72 G/278.26G; G/278.26G; G/278.26G; G/278.26G; G/278.26G
240 POWDER, FOR SOLUTION ORAL
Qty: 1 | Refills: 0 | Status: DISCONTINUED | COMMUNITY
Start: 2023-06-28 | End: 2023-11-29

## 2023-11-29 RX ORDER — LOSARTAN POTASSIUM 50 MG/1
50 TABLET, FILM COATED ORAL DAILY
Qty: 90 | Refills: 3 | Status: DISCONTINUED | COMMUNITY
Start: 2018-12-04 | End: 2023-11-29

## 2023-12-14 ENCOUNTER — APPOINTMENT (OUTPATIENT)
Dept: CARDIOLOGY | Facility: CLINIC | Age: 78
End: 2023-12-14
Payer: MEDICARE

## 2023-12-14 PROCEDURE — 93306 TTE W/DOPPLER COMPLETE: CPT

## 2024-01-22 ENCOUNTER — APPOINTMENT (OUTPATIENT)
Dept: CARDIOLOGY | Facility: CLINIC | Age: 79
End: 2024-01-22

## 2024-02-07 NOTE — DISCHARGE NOTE NURSING/CASE MANAGEMENT/SOCIAL WORK - NSTOBACCONEVERSMOKERY/N_GEN_A
No Performing Laboratory: [id number] Bill For Surgical Tray: no Billing Type: Third-Party Bill Expected Date Of Service: 03/08/2023

## 2024-05-30 NOTE — ED ADULT NURSE NOTE - PAIN: PRESENCE, MLM
Patient reporting shortness of breath since this morning. Able to talk in full sentences. Wears O2 at night.      Triage Assessment (Adult)       Row Name 05/30/24 2074          Triage Assessment    Airway WDL WDL        Respiratory WDL    Respiratory WDL X;rhythm/pattern     Rhythm/Pattern, Respiratory shortness of breath        Skin Circulation/Temperature WDL    Skin Circulation/Temperature WDL WDL        Cardiac WDL    Cardiac WDL WDL        Peripheral/Neurovascular WDL    Peripheral Neurovascular WDL WDL        Cognitive/Neuro/Behavioral WDL    Cognitive/Neuro/Behavioral WDL WDL                     
denies pain/discomfort (Rating = 0)

## 2024-05-31 ENCOUNTER — APPOINTMENT (OUTPATIENT)
Dept: CARDIOLOGY | Facility: CLINIC | Age: 79
End: 2024-05-31
Payer: MEDICARE

## 2024-05-31 DIAGNOSIS — E78.5 HYPERLIPIDEMIA, UNSPECIFIED: ICD-10-CM

## 2024-05-31 DIAGNOSIS — I25.10 ATHEROSCLEROTIC HEART DISEASE OF NATIVE CORONARY ARTERY W/OUT ANGINA PECTORIS: ICD-10-CM

## 2024-05-31 DIAGNOSIS — I10 ESSENTIAL (PRIMARY) HYPERTENSION: ICD-10-CM

## 2024-05-31 PROCEDURE — 99215 OFFICE O/P EST HI 40 MIN: CPT

## 2024-05-31 RX ORDER — CLOPIDOGREL BISULFATE 75 MG/1
75 TABLET, FILM COATED ORAL
Qty: 30 | Refills: 3 | Status: ACTIVE | COMMUNITY

## 2024-05-31 RX ORDER — ENALAPRIL MALEATE 5 MG/1
5 TABLET ORAL
Refills: 0 | Status: DISCONTINUED | COMMUNITY
End: 2024-05-31

## 2024-05-31 RX ORDER — TELMISARTAN AND HYDROCHLOROTHIAZIDE 40; 12.5 MG/1; MG/1
40-12.5 TABLET ORAL
Refills: 0 | Status: ACTIVE | COMMUNITY

## 2024-05-31 RX ORDER — AMLODIPINE BESYLATE 10 MG/1
10 TABLET ORAL
Refills: 0 | Status: DISCONTINUED | COMMUNITY
End: 2024-05-31

## 2024-05-31 RX ORDER — ALIROCUMAB 150 MG/ML
150 INJECTION, SOLUTION SUBCUTANEOUS
Qty: 2 | Refills: 5 | Status: ACTIVE | COMMUNITY
Start: 2023-11-29 | End: 1900-01-01

## 2024-05-31 RX ORDER — ATENOLOL 25 MG/1
25 TABLET ORAL DAILY
Qty: 90 | Refills: 3 | Status: ACTIVE | COMMUNITY

## 2024-05-31 NOTE — DISCUSSION/SUMMARY
[FreeTextEntry1] : Mr. Howe is a 79 y/o Male w/ PMHx of HTN, HLD, CKD stage III, BPH s/p Now s/p SONI x1 mLAD 11/13/23 presents in for 6 month follow up.  Seen with Dr. Agarwal . Reports no chest discomfort, shortness of breath, syncope or palpitations.   No rest pain, no PND.    Of note is severe right leg discomfort behind right calf with minimal walking and at rest.  Known PAD and seen in the past by Dr. Dong.  BP today manually: 135/80, HR regular. EKG: no ischemic changes.  EKG: no ischemic changes, unchanged left anterior fascicular block.  In addition: patient has had a colon mass requiring removal prior to PCI, and due to follow up colonoscopy    # PAD with rest pain -urgent re consult Dr. Dong -FREDA and FREDA with exercise ordered   #CAD - Now s/p cardiac cath 11/13: SONI x1 mLAD (95%) via RRA - Continue ASA 81mg QD, Plavix 75mg QD for 1 year ( 11/2024) Encouraged patient to continue healthy exercise and eating habits, focusing on a Mediterranean style of eating and aiming for the recommended 150 minutes per week of moderate physical activity.  # HTN -- Continue current antihypertensive regimen  # HLD - Statin intolerant Recommend PCSK-9 : renewed    # CKD stage III - SCr 1.3 2023 ; pt's baseline 1.8 ( creatinine 1.38 )   - Monitor serum Cr   # BPH - Continue Terazosin TIC Doxazosin 4mg QD     #Colon mass f/u needed -colonoscopy to be deferred as can wait until after off Plavix 11/2024  Follow up:  Vascular : 1 week Dr. Dong/ urgent FREDA ordered  Labwork ordered  f/u 6 months Dr. Agarwal

## 2024-05-31 NOTE — REASON FOR VISIT
[Hyperlipidemia] : hyperlipidemia [Coronary Artery Disease] : coronary artery disease [Initial Evaluation] : an initial evaluation of [Hypertension] : hypertension [FreeTextEntry1] : CKD

## 2024-05-31 NOTE — END OF VISIT
[Time Spent: ___ minutes] : I have spent [unfilled] minutes of time on the encounter. [FreeTextEntry3] : I, Dr. Dorothy Agarwal, personally performed the evaluation and management (E/M) services for this established patient who presents today with (a) new problem(s)/exacerbation of (an) existing condition(s).  That E/M includes conducting the examination, assessing all new/exacerbated conditions, and establishing a new plan of care.  Today, my ACP, was here to observe my evaluation and management services for this new problem/exacerbated condition to be followed going forward.

## 2024-05-31 NOTE — HISTORY OF PRESENT ILLNESS
[FreeTextEntry1] : Mr. Howe is a 77 y/o Male w/ PMHx of HTN, HLD, CKD stage III, BPH s/p Now s/p SONI x1 mLAD 23 presents in for 6 month follow up.  Seen with Dr. Agarwal . Reports no chest discomfort, shortness of breath, syncope or palpitations.   No rest pain, no PND.    Of note is severe right leg discomfort behind right calf with minimal walking and at rest.  Known PAD and seen in the past by Dr. Dong.  BP today manually: 135/80, HR regular.  EKG: no ischemic changes, unchanged left anterior fascicular block.  Annual labwork ordered.  Cath 23:  mid LAD PCI 95% seenosis, 1st dia%, 2nd dia%     # CAD - Now s/p cardiac cath : SONI x1 mLAD (95%) via RRA - Continue ASA 81mg QD, Plavix 75mg QD Encouraged patient to continue healthy exercise and eating habits, focusing on a Mediterranean style of eating and aiming for the recommended 150 minutes per week of moderate physical activity.  # HTN -- Continue Enalapril 5mg QD and Amlodipine 10 mg QD  Carvedilol 3.125 mg bid    # HLD - Statin intolerant Recommend PCSK-9    # CKD stage III - SCr 1.52; pt's baseline 1.8 ( creatinine 1.38 )   - Monitor serum Cr daily   # BPH - Continue Terazosin TIC Doxazosin 4mg QD

## 2024-05-31 NOTE — CARDIOLOGY SUMMARY
[___] : [unfilled] [de-identified] : Interventional Details  Mid left anterior descending: This was a 95 % De Arun stenosis. IVUS was performed. Guidewire crossing was successful.  A successful Balloon angioplasty was performed using a 2.5 X 20 EUPHORA, a RONNY [id number], and a JL 3.5 LAUNCHER 5FR .     The inflation pressure was 14 brisa for the duration of 9.0 seconds.    The inflation pressure was 14 brisa for the duration of 7.0 seconds.    The inflation pressure was 14 brisa for the duration of 7.0 seconds.   A successful Drug Eluting Stent was deployed using a 3.00 X 48 SYNERGY XD.   The inflation pressure was 16 brisa for the duration of 12.0 seconds.   A successful Balloon angioplasty was performed using a 4.00 X 15 EUPHORA NC.   The inflation pressure was 10 brisa for the duration of 7.0 seconds.    The inflation pressure was 14 brisa for the duration of 8.0 seconds.    The inflation pressure was 18 brisa for the duration of 13.0 seconds.    The inflation pressure was 18 brisa for the duration of 9.0 seconds.   A successful Balloon angioplasty was performed using a 4.50 X 20 MM NC EMERGE .  Patient: CHECO QUINONES           MRN: [id number] Study Date: 11/13/2023   03:50 PM      Page 2 of 5

## 2024-06-03 LAB
25(OH)D3 SERPL-MCNC: 58.3 NG/ML
ANION GAP SERPL CALC-SCNC: 12 MMOL/L
BUN SERPL-MCNC: 38 MG/DL
CALCIUM SERPL-MCNC: 8.8 MG/DL
CHLORIDE SERPL-SCNC: 104 MMOL/L
CHOLEST SERPL-MCNC: 156 MG/DL
CO2 SERPL-SCNC: 24 MMOL/L
CREAT SERPL-MCNC: 1.53 MG/DL
EGFR: 46 ML/MIN/1.73M2
ESTIMATED AVERAGE GLUCOSE: 117 MG/DL
GLUCOSE SERPL-MCNC: 93 MG/DL
HBA1C MFR BLD HPLC: 5.7 %
HCT VFR BLD CALC: 34 %
HDLC SERPL-MCNC: 56 MG/DL
HGB BLD-MCNC: 11.2 G/DL
LDLC SERPL CALC-MCNC: 91 MG/DL
MCHC RBC-ENTMCNC: 29.6 PG
MCHC RBC-ENTMCNC: 32.9 GM/DL
MCV RBC AUTO: 89.9 FL
NONHDLC SERPL-MCNC: 100 MG/DL
PLATELET # BLD AUTO: 218 K/UL
POTASSIUM SERPL-SCNC: 4.9 MMOL/L
RBC # BLD: 3.78 M/UL
RBC # FLD: 14.3 %
SODIUM SERPL-SCNC: 141 MMOL/L
TRIGL SERPL-MCNC: 38 MG/DL
TSH SERPL-ACNC: 2.14 UIU/ML
WBC # FLD AUTO: 5.62 K/UL

## 2024-06-05 ENCOUNTER — APPOINTMENT (OUTPATIENT)
Dept: CARDIOLOGY | Facility: CLINIC | Age: 79
End: 2024-06-05
Payer: MEDICARE

## 2024-06-05 VITALS
WEIGHT: 157 LBS | HEART RATE: 70 BPM | SYSTOLIC BLOOD PRESSURE: 140 MMHG | DIASTOLIC BLOOD PRESSURE: 69 MMHG | OXYGEN SATURATION: 99 % | BODY MASS INDEX: 24.59 KG/M2

## 2024-06-05 DIAGNOSIS — Z95.5 PRESENCE OF CORONARY ANGIOPLASTY IMPLANT AND GRAFT: ICD-10-CM

## 2024-06-05 DIAGNOSIS — I73.9 PERIPHERAL VASCULAR DISEASE, UNSPECIFIED: ICD-10-CM

## 2024-06-05 PROCEDURE — 99204 OFFICE O/P NEW MOD 45 MIN: CPT

## 2024-06-06 ENCOUNTER — APPOINTMENT (OUTPATIENT)
Dept: ULTRASOUND IMAGING | Facility: HOSPITAL | Age: 79
End: 2024-06-06
Payer: MEDICARE

## 2024-06-06 ENCOUNTER — RESULT REVIEW (OUTPATIENT)
Age: 79
End: 2024-06-06

## 2024-06-06 ENCOUNTER — OUTPATIENT (OUTPATIENT)
Dept: OUTPATIENT SERVICES | Facility: HOSPITAL | Age: 79
LOS: 1 days | End: 2024-06-06
Payer: MEDICARE

## 2024-06-06 DIAGNOSIS — Z98.890 OTHER SPECIFIED POSTPROCEDURAL STATES: Chronic | ICD-10-CM

## 2024-06-06 DIAGNOSIS — I73.9 PERIPHERAL VASCULAR DISEASE, UNSPECIFIED: ICD-10-CM

## 2024-06-06 PROCEDURE — 93925 LOWER EXTREMITY STUDY: CPT | Mod: 26

## 2024-06-06 PROCEDURE — 93924 LWR XTR VASC STDY BILAT: CPT | Mod: 26

## 2024-06-06 PROCEDURE — 93925 LOWER EXTREMITY STUDY: CPT

## 2024-06-06 PROCEDURE — 93924 LWR XTR VASC STDY BILAT: CPT

## 2024-06-10 ENCOUNTER — NON-APPOINTMENT (OUTPATIENT)
Age: 79
End: 2024-06-10

## 2024-06-10 NOTE — REVIEW OF SYSTEMS
[Negative] : Heme/Lymph [FreeTextEntry5] : see HPI [FreeTextEntry9] : see HPI [de-identified] : see HPI

## 2024-06-10 NOTE — PHYSICAL EXAM
[Heart Rate And Rhythm] : heart rate and rhythm were normal [Heart Sounds] : normal S1 and S2 [Respiration, Rhythm And Depth] : normal respiratory rhythm and effort [Auscultation Breath Sounds / Voice Sounds] : lungs were clear to auscultation bilaterally [Bowel Sounds] : normal bowel sounds [Abdomen Soft] : soft [Abnormal Walk] : normal gait [] : no ischemic changes [No Skin Ulcers] : no skin ulcer [Oriented To Time, Place, And Person] : oriented to person, place, and time [FreeTextEntry1] : No LE edema. Palpable DP and PT bilaterally.

## 2024-06-10 NOTE — REASON FOR VISIT
[Initial Evaluation] : an initial evaluation of [FreeTextEntry2] : vascular evaluation [FreeTextEntry1] : 6/5/2024  78 y/o Male w/ PMHx of HTN, HLD, CKD stage 3, BPH, CAD s/p PCI 11/2023, who presents for vascular evaluation.   Reports at times the legs hurt with standing. Reports back pain.  Bilateral calf pain with ambulation for 3 blocks, which has progressed.  PCP (Dr. Coleman) informed him that he 50-70% lower extremity arterial stenosis.  Palpable bilateral pedal pulses.   2017  I had the pleasure of seeing Mr. Griffin Howe. As you know he is a 71 year old male with a history of hypertension and CKD. He is here today for evaluation of symptoms of claudication. States that for the past year he has been experiencing worsening pain in both of his legs. The right leg is much more bothersome than the left leg. He can walk for long distances, but only if he walks very slowly. At a normal pace he develops severe calf cramping and right foot pain. his leg turns into a "rock." This resolves with rest. he was seen at a VEIN center and was reccomended compression stockings (NO HELP) and was told that it was his veins causing this - which I doubt.    He does not have any rest pain. He was previously on a statin meidcation which he stopped due to myalgia. He does get left calf claudication, but his right side is more symptomatic.    He is currently on    Amlodipine  atenolol  terazosin  Vit D  aspirin 81  Herbal cholesterol medication    He is accompanied by his son who is a denist.    Labowork reviewed from December 2016 with creatinine of 1.37 GFR or 54 LDL of 124.

## 2024-06-10 NOTE — ASSESSMENT
[FreeTextEntry1] : Assessment: 1. Lower back pain     Spinal disease noted on MRI 2. HTN, HLD 3. CKD stage 3 4. CAD s/p PCI 5. History of mild PAD  Plan: 1. Recommend Spine MD consultation / follow-up. 2. Continue medial therapy. 3. Plan for LE arterial duplex and FREDA. 4. Continue excellent care with Dr. Agarwal. 5. Follow-up in 6 months.  Although he has PAD, he does have good pulses on exam, and clinical vignette also has an element of non-vasuclar causes .  He should be evaluated for non-vascular causes of leg pains as well.

## 2024-09-04 NOTE — H&P CARDIOLOGY - HEIGHT TYPE
LAST INR:  1.1 INR  Current warfarin dosing verified with patient, informed of INR result and obtained patient findings.  today  = 1.3 INR is below goal range.  spoke with Dr Medina who is here in the office instructions  are to increase his  warfarin 5.5 mg, recheck  INR  in 1 week. Daily. Gerd verbalized understanding.  Warfarin tablets  escribed to [facility] with instructions to table 1/2 tablet in addition to the 5 mg daily , total weekly dose = 38.5 mg       stated

## 2024-11-18 ENCOUNTER — APPOINTMENT (OUTPATIENT)
Dept: CARDIOLOGY | Facility: CLINIC | Age: 79
End: 2024-11-18
Payer: MEDICARE

## 2024-11-18 ENCOUNTER — NON-APPOINTMENT (OUTPATIENT)
Age: 79
End: 2024-11-18

## 2024-11-18 VITALS
OXYGEN SATURATION: 96 % | HEART RATE: 61 BPM | DIASTOLIC BLOOD PRESSURE: 107 MMHG | WEIGHT: 157 LBS | BODY MASS INDEX: 24.64 KG/M2 | SYSTOLIC BLOOD PRESSURE: 168 MMHG | HEIGHT: 67 IN

## 2024-11-18 PROCEDURE — 93000 ELECTROCARDIOGRAM COMPLETE: CPT

## 2024-11-18 PROCEDURE — G2211 COMPLEX E/M VISIT ADD ON: CPT

## 2024-11-18 PROCEDURE — 99214 OFFICE O/P EST MOD 30 MIN: CPT

## 2024-12-04 ENCOUNTER — APPOINTMENT (OUTPATIENT)
Dept: CARDIOLOGY | Facility: CLINIC | Age: 79
End: 2024-12-04
Payer: MEDICARE

## 2024-12-04 ENCOUNTER — APPOINTMENT (OUTPATIENT)
Dept: GASTROENTEROLOGY | Facility: CLINIC | Age: 79
End: 2024-12-04
Payer: MEDICARE

## 2024-12-04 VITALS
HEART RATE: 71 BPM | OXYGEN SATURATION: 98 % | WEIGHT: 157 LBS | SYSTOLIC BLOOD PRESSURE: 173 MMHG | DIASTOLIC BLOOD PRESSURE: 70 MMHG | BODY MASS INDEX: 24.59 KG/M2

## 2024-12-04 DIAGNOSIS — I73.9 PERIPHERAL VASCULAR DISEASE, UNSPECIFIED: ICD-10-CM

## 2024-12-04 DIAGNOSIS — K63.5 POLYP OF COLON: ICD-10-CM

## 2024-12-04 PROCEDURE — 99214 OFFICE O/P EST MOD 30 MIN: CPT

## 2024-12-04 PROCEDURE — G2211 COMPLEX E/M VISIT ADD ON: CPT

## 2024-12-04 RX ORDER — POLYETHYLENE GLYCOL 3350 AND ELECTROLYTES WITH LEMON FLAVOR 236; 22.74; 6.74; 5.86; 2.97 G/4L; G/4L; G/4L; G/4L; G/4L
236 POWDER, FOR SOLUTION ORAL
Qty: 1 | Refills: 0 | Status: ACTIVE | COMMUNITY
Start: 2024-12-04 | End: 1900-01-01

## 2025-01-09 ENCOUNTER — TRANSCRIPTION ENCOUNTER (OUTPATIENT)
Age: 80
End: 2025-01-09

## 2025-01-09 ENCOUNTER — APPOINTMENT (OUTPATIENT)
Dept: GASTROENTEROLOGY | Facility: HOSPITAL | Age: 80
End: 2025-01-09

## 2025-01-09 ENCOUNTER — RESULT REVIEW (OUTPATIENT)
Age: 80
End: 2025-01-09

## 2025-01-09 ENCOUNTER — OUTPATIENT (OUTPATIENT)
Dept: OUTPATIENT SERVICES | Facility: HOSPITAL | Age: 80
LOS: 1 days | Discharge: HOME | End: 2025-01-09
Payer: MEDICARE

## 2025-01-09 VITALS
RESPIRATION RATE: 14 BRPM | DIASTOLIC BLOOD PRESSURE: 55 MMHG | OXYGEN SATURATION: 98 % | WEIGHT: 151.9 LBS | TEMPERATURE: 98 F | HEIGHT: 67 IN | HEART RATE: 59 BPM | SYSTOLIC BLOOD PRESSURE: 145 MMHG

## 2025-01-09 VITALS
OXYGEN SATURATION: 96 % | DIASTOLIC BLOOD PRESSURE: 53 MMHG | RESPIRATION RATE: 18 BRPM | SYSTOLIC BLOOD PRESSURE: 145 MMHG | HEART RATE: 58 BPM

## 2025-01-09 DIAGNOSIS — K63.5 POLYP OF COLON: ICD-10-CM

## 2025-01-09 DIAGNOSIS — Z98.890 OTHER SPECIFIED POSTPROCEDURAL STATES: Chronic | ICD-10-CM

## 2025-01-09 PROCEDURE — 45385 COLONOSCOPY W/LESION REMOVAL: CPT

## 2025-01-09 PROCEDURE — 45380 COLONOSCOPY AND BIOPSY: CPT | Mod: XU

## 2025-01-09 PROCEDURE — 88305 TISSUE EXAM BY PATHOLOGIST: CPT | Mod: 26

## 2025-01-09 NOTE — PRE PROCEDURE NOTE - PRE PROCEDURE EVALUATION
HPI:    Here for colonoscopy for surveillance of large polyp s/p EMR.    PAST MEDICAL & SURGICAL HISTORY:  HTN (hypertension)      History of BPH      Lipidemia      H/O colonoscopy        See chart.    MEDICATIONS:    See chart.     ALLERGIES:  statins (Muscle Pain)  sulfa drugs (Other; Rash)  nitroglycerin (Headache; Faint)    See chart.     SOCIAL HISTORY:     Denies toxic habits.     FAMILY HISTORY:  Family history of essential hypertension      Noncontributory.     REVIEW OF SYSTEMS:  CONSTITUTIONAL: No weakness, fevers or chills.  EYES/ENT: No visual changes;  No vertigo or throat pain.  NECK: No pain or stiffness.  RESPIRATORY: No cough, wheezing, hemoptysis; No shortness of breath.  CARDIOVASCULAR: No chest pain or palpitations.  GASTROINTESTINAL: As per HPI.   GENITOURINARY: No dysuria, frequency or hematuria.  NEUROLOGICAL: No numbness or weakness.  SKIN: No itching, rashes.      PHYSICAL EXAM:  VITAL SIGNS:  T(C): 36.4 (01-09-25 @ 12:50), Max: 36.4 (01-09-25 @ 12:50)  HR: 59 (01-09-25 @ 12:50) (59 - 59)  BP: 145/55 (01-09-25 @ 12:50) (145/55 - 145/55)  RR: 14 (01-09-25 @ 12:50) (14 - 14)  SpO2: 98% (01-09-25 @ 12:50) (98% - 98%)  I/Os:     See chart.     GENERAL: FAM, non toxic, comfortable in bed  HEENT: EOMI, no icterus, no tracheal deviation, moist mucus membranes   CARDIO: Regular rate and rhythm, no murmurs, rubs or gallops  LUNGS: No wheezing, rales or rhonchi  ABDOMEN: Soft, non tender, non distended, no rebound or guarding  VASCULAR: Warm and well perfused without peripheral edema and palpable pulses  PSYCH AAO x 3, normal mood and affect   SKIN: warm, dry, intact     LABS:                 RADIOLOGY & ADDITIONAL TESTS: Reviewed.       Risks, benefits, and alternatives of the procedure discussed at length including but not limited to bleeding, perforation, anesthesia related complication, infection, etc. Patient expressed understanding and agreeable for procedure. Patient stable for planned procedure.

## 2025-01-09 NOTE — ASU DISCHARGE PLAN (ADULT/PEDIATRIC) - NS MD DC FALL RISK RISK
For information on Fall & Injury Prevention, visit: [email or web address] OR  [email or web address] OR  https://www.cdc.gov/steadi/patient.html

## 2025-01-09 NOTE — ASU DISCHARGE PLAN (ADULT/PEDIATRIC) - FINANCIAL ASSISTANCE
[facility] provides services at a reduced cost to those who are determined to be eligible through [facility]’s financial assistance program. Information regarding [facility]’s financial assistance program can be found by going to [email or web address] or by calling [phone].

## 2025-01-13 RX ORDER — EVOLOCUMAB 140 MG/ML
140 INJECTION, SOLUTION SUBCUTANEOUS
Qty: 2 | Refills: 3 | Status: ACTIVE | COMMUNITY
Start: 2025-01-13 | End: 1900-01-01

## 2025-01-16 LAB — SURGICAL PATHOLOGY STUDY: SIGNIFICANT CHANGE UP

## 2025-01-21 NOTE — ED ADULT TRIAGE NOTE - RESPIRATORY RATE (BREATHS/MIN)
Lab Results   Component Value Date    HGBA1C 7.6 (H) 01/11/2025     Work on improving blood sugars. Needs to eat better and be more active.   20

## 2025-02-10 NOTE — DISCHARGE NOTE PROVIDER - DISCHARGE SERVICE FOR PATIENT
Chart and labs reviewed for length of stay. No nutrition intervention indicated at this time. RD available via consult. Will continue to follow per protocol.     on the discharge service for the patient. I have reviewed and made amendments to the documentation where necessary.

## 2025-03-12 ENCOUNTER — APPOINTMENT (OUTPATIENT)
Dept: CARDIOLOGY | Facility: CLINIC | Age: 80
End: 2025-03-12

## 2025-03-12 VITALS — WEIGHT: 146 LBS | HEART RATE: 64 BPM | BODY MASS INDEX: 22.87 KG/M2 | OXYGEN SATURATION: 98 %

## 2025-03-12 PROCEDURE — G2211 COMPLEX E/M VISIT ADD ON: CPT

## 2025-03-12 PROCEDURE — 99214 OFFICE O/P EST MOD 30 MIN: CPT

## 2025-03-12 PROCEDURE — 93000 ELECTROCARDIOGRAM COMPLETE: CPT

## 2025-03-12 RX ORDER — AMLODIPINE BESYLATE 2.5 MG/1
2.5 TABLET ORAL
Refills: 0 | Status: ACTIVE | COMMUNITY

## 2025-08-13 ENCOUNTER — APPOINTMENT (OUTPATIENT)
Dept: CARDIOLOGY | Facility: CLINIC | Age: 80
End: 2025-08-13

## 2025-08-13 VITALS
SYSTOLIC BLOOD PRESSURE: 149 MMHG | HEART RATE: 65 BPM | WEIGHT: 155 LBS | BODY MASS INDEX: 24.28 KG/M2 | OXYGEN SATURATION: 95 % | DIASTOLIC BLOOD PRESSURE: 72 MMHG

## 2025-08-13 PROCEDURE — 99215 OFFICE O/P EST HI 40 MIN: CPT

## 2025-08-13 PROCEDURE — 93000 ELECTROCARDIOGRAM COMPLETE: CPT

## 2025-08-13 PROCEDURE — G2211 COMPLEX E/M VISIT ADD ON: CPT

## 2025-08-22 ENCOUNTER — RX RENEWAL (OUTPATIENT)
Age: 80
End: 2025-08-22

## 2025-08-25 RX ORDER — EVOLOCUMAB 140 MG/ML
140 INJECTION, SOLUTION SUBCUTANEOUS
Qty: 2 | Refills: 2 | Status: ACTIVE | COMMUNITY
Start: 2025-08-22 | End: 1900-01-01

## (undated) DEVICE — TUBING IV SET GRAVITY 3Y 100" MACRO

## (undated) DEVICE — DRSG 2X2

## (undated) DEVICE — SALIVA EJECTOR (BLUE)

## (undated) DEVICE — CATH IV SAFE BC 22G X 1" (BLUE)

## (undated) DEVICE — TUBING ENDO EXT OLYMPUS 160 24HR USE GI

## (undated) DEVICE — BIOPSY FORCEP COLD DISP

## (undated) DEVICE — ELCTR ECG CONDUCTIVE ADHESIVE

## (undated) DEVICE — BIOPSY FORCEP RADIAL JAW 4 STANDARD WITH NEEDLE

## (undated) DEVICE — TUBING SUCTION NONCONDUCTIVE 6MM X 12FT

## (undated) DEVICE — BASIN EMESIS 10IN GRADUATED MAUVE

## (undated) DEVICE — SOLIDIFIER ISOLYZER 2000 CC

## (undated) DEVICE — CONTAINER FORMALIN 80ML YELLOW

## (undated) DEVICE — LUBRICATING JELLY HR ONE SHOT 3G

## (undated) DEVICE — DRSG CURITY GAUZE SPONGE 4 X 4" 12-PLY NON-STERILE

## (undated) DEVICE — PACK IV START WITH CHG

## (undated) DEVICE — SNARE CAPTIVATOR COLD RND STIFF 10X2.4X2.8MM 240CM

## (undated) DEVICE — ELCTR GROUNDING PAD ADULT COVIDIEN

## (undated) DEVICE — DRSG BANDAID 0.75X3"

## (undated) DEVICE — GOWN LG

## (undated) DEVICE — SOLIDIFIER 1200CC

## (undated) DEVICE — PLATE NESSY 170

## (undated) DEVICE — FORMALIN CONTAINER MED

## (undated) DEVICE — LIFTER SYR EVERLIFT AGENT SUBMUCOSAL 5ML

## (undated) DEVICE — LINE BREATHE SAMPLNG

## (undated) DEVICE — CONTAINER FORMALIN 10% 20ML

## (undated) DEVICE — 3D MATRIX ADAPTER SYRINGE

## (undated) DEVICE — TUBING MEDI-VAC W MAXIGRIP CONNECTORS 1/4"X6'

## (undated) DEVICE — FORCEP RADIAL JAW 4 HOT DISP

## (undated) DEVICE — SNARE CAPTIVATOR II 15MM

## (undated) DEVICE — Device

## (undated) DEVICE — POLY TRAP ETRAP

## (undated) DEVICE — KIT ENDO CONSUMABLES REPROCESSING IF1000 8 USE